# Patient Record
Sex: MALE | Race: WHITE | NOT HISPANIC OR LATINO
[De-identification: names, ages, dates, MRNs, and addresses within clinical notes are randomized per-mention and may not be internally consistent; named-entity substitution may affect disease eponyms.]

---
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
## 2019-01-04 ENCOUNTER — APPOINTMENT (OUTPATIENT)
Dept: PEDIATRIC ALLERGY IMMUNOLOGY | Facility: CLINIC | Age: 19
End: 2019-01-04
Payer: COMMERCIAL

## 2019-01-04 PROCEDURE — 90834 PSYTX W PT 45 MINUTES: CPT

## 2019-01-07 ENCOUNTER — APPOINTMENT (OUTPATIENT)
Dept: ENDOCRINOLOGY | Facility: CLINIC | Age: 19
End: 2019-01-07
Payer: COMMERCIAL

## 2019-01-07 VITALS
SYSTOLIC BLOOD PRESSURE: 122 MMHG | DIASTOLIC BLOOD PRESSURE: 70 MMHG | HEIGHT: 71 IN | HEART RATE: 79 BPM | WEIGHT: 262 LBS | BODY MASS INDEX: 36.68 KG/M2 | OXYGEN SATURATION: 97 %

## 2019-01-07 PROCEDURE — 99214 OFFICE O/P EST MOD 30 MIN: CPT

## 2019-01-07 NOTE — REVIEW OF SYSTEMS
[Tremors] : tremors [Heat Intolerance] : heat intolerant [Negative] : ENT [All other systems negative] : All other systems negative [Fatigue] : no fatigue [Decreased Appetite] : appetite not decreased [Neck Pain] : no neck pain [Nasal Congestion] : no nasal congestion [Chest Pain] : no chest pain [Palpitations] : no palpitations [Heart Rate Is Slow] : the heart rate was not slow [Heart Rate Is Fast] : the heart rate was not fast [Shortness Of Breath] : no shortness of breath [Wheezing] : no wheezing was heard [Nausea] : no nausea [Vomiting] : no vomiting was observed [Constipation] : no constipation [Diarrhea] : no diarrhea [Headache] : no headaches [Depression] : no depression [Anxiety] : no anxiety [Cold Intolerance] : cold tolerant [Easy Bleeding] : no ~M tendency for easy bleeding [Easy Bruising] : no tendency for easy bruising

## 2019-01-07 NOTE — ASSESSMENT
[FreeTextEntry1] : Patient is a 18 year old woman here for follow up hormone evaluation and continuance of gender affirming hormone therapy and abnormal thyroid function tests. \par \par 1. Male to Female: Discussed treatment with estradiol and spironolactone to attain testosterone level of < 50. Discussed the risks of DVT and thromboembolism, dyslipidemia, liver enzyme elevation with estradiol. Patient consents to treatment. Will monitor BP and potassium levels while on spironolactone. Will need to perform self-testicular exams and annual exam with risk screening for testicular cancer, prostate cancer.\par The patient provided limited information but did not go into much detail regarding gender story. States she had psychiatry care with Dr. Morris\par Increase estradiol to 2 mg BID and spironolactone 100 mg BID\par Repeat levels today\par Possible plans for bottom surgery at some point in the future\par Does not want to sperm bank at this time and has no plans for children in the future\par Not yet working on gender marker change, "not there yet"\par Psychiatry follow up\par \par \par 2. Abnormal thyroid labs\par -patient has elevated Free T4 and T3 along with high TSH receptor antibodies. Tremors on exam and symptoms of heat intolerance.  Repeat TFTs today
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
## 2019-01-07 NOTE — PHYSICAL EXAM
[Alert] : alert [No Acute Distress] : no acute distress [Well Nourished] : well nourished [Well Developed] : well developed [No Proptosis] : no proptosis [Normal Hearing] : hearing was normal [Thyroid Not Enlarged] : the thyroid was not enlarged [No Respiratory Distress] : no respiratory distress [Normal Rate and Effort] : normal respiratory rhythm and effort [No Accessory Muscle Use] : no accessory muscle use [Clear to Auscultation] : lungs were clear to auscultation bilaterally [Normal Rate] : heart rate was normal  [Normal S1, S2] : normal S1 and S2 [Regular Rhythm] : with a regular rhythm [Normal Bowel Sounds] : normal bowel sounds [Not Tender] : non-tender [Soft] : abdomen soft [Normal Gait] : normal gait [No Joint Swelling] : no joint swelling seen [Normal Strength/Tone] : muscle strength and tone were normal [Normal Insight/Judgement] : insight and judgment were intact [Normal Affect] : the affect was normal [Normal Mood] : the mood was normal [de-identified] : +tremors

## 2019-01-09 LAB
ALBUMIN SERPL ELPH-MCNC: 4.8 G/DL
ALP BLD-CCNC: 58 U/L
ALT SERPL-CCNC: 9 U/L
ANION GAP SERPL CALC-SCNC: 12 MMOL/L
AST SERPL-CCNC: 13 U/L
BASOPHILS # BLD AUTO: 0.03 K/UL
BASOPHILS NFR BLD AUTO: 0.4 %
BILIRUB SERPL-MCNC: 0.4 MG/DL
BUN SERPL-MCNC: 17 MG/DL
CALCIUM SERPL-MCNC: 9.9 MG/DL
CHLORIDE SERPL-SCNC: 103 MMOL/L
CHOLEST SERPL-MCNC: 209 MG/DL
CHOLEST/HDLC SERPL: 4.5 RATIO
CO2 SERPL-SCNC: 25 MMOL/L
CREAT SERPL-MCNC: 0.94 MG/DL
EOSINOPHIL # BLD AUTO: 0.16 K/UL
EOSINOPHIL NFR BLD AUTO: 2.1 %
ESTRADIOL SERPL-MCNC: 43 PG/ML
FSH SERPL-MCNC: 1.6 IU/L
GLUCOSE SERPL-MCNC: 90 MG/DL
HBA1C MFR BLD HPLC: 5.3 %
HCT VFR BLD CALC: 44.2 %
HDLC SERPL-MCNC: 46 MG/DL
HGB BLD-MCNC: 15 G/DL
IMM GRANULOCYTES NFR BLD AUTO: 0.5 %
LDLC SERPL CALC-MCNC: 134 MG/DL
LYMPHOCYTES # BLD AUTO: 1.96 K/UL
LYMPHOCYTES NFR BLD AUTO: 26.1 %
MAN DIFF?: NORMAL
MCHC RBC-ENTMCNC: 27.8 PG
MCHC RBC-ENTMCNC: 33.9 GM/DL
MCV RBC AUTO: 82 FL
MONOCYTES # BLD AUTO: 0.48 K/UL
MONOCYTES NFR BLD AUTO: 6.4 %
NEUTROPHILS # BLD AUTO: 4.85 K/UL
NEUTROPHILS NFR BLD AUTO: 64.5 %
PLATELET # BLD AUTO: 245 K/UL
POTASSIUM SERPL-SCNC: 4.3 MMOL/L
PROLACTIN SERPL-MCNC: 13.7 NG/ML
PROT SERPL-MCNC: 7.5 G/DL
RBC # BLD: 5.39 M/UL
RBC # FLD: 12.6 %
SODIUM SERPL-SCNC: 140 MMOL/L
T3 SERPL-MCNC: 139 NG/DL
T4 FREE SERPL-MCNC: 1.1 NG/DL
TESTOST SERPL-MCNC: 405 NG/DL
TRIGL SERPL-MCNC: 144 MG/DL
TSH SERPL-ACNC: 1.44 UIU/ML
WBC # FLD AUTO: 7.52 K/UL

## 2019-01-10 LAB — TSH RECEPTOR AB: <0.5 IU/L

## 2019-01-16 LAB — T4 FREE SERPL DIALY-MCNC: 0.94 NG/DL

## 2019-01-18 ENCOUNTER — APPOINTMENT (OUTPATIENT)
Dept: PEDIATRIC ALLERGY IMMUNOLOGY | Facility: CLINIC | Age: 19
End: 2019-01-18
Payer: COMMERCIAL

## 2019-01-18 PROCEDURE — 90834 PSYTX W PT 45 MINUTES: CPT

## 2019-01-31 ENCOUNTER — OTHER (OUTPATIENT)
Age: 19
End: 2019-01-31

## 2019-02-01 ENCOUNTER — APPOINTMENT (OUTPATIENT)
Dept: PEDIATRIC ALLERGY IMMUNOLOGY | Facility: CLINIC | Age: 19
End: 2019-02-01
Payer: COMMERCIAL

## 2019-02-01 PROCEDURE — 90834 PSYTX W PT 45 MINUTES: CPT

## 2019-02-13 ENCOUNTER — OTHER (OUTPATIENT)
Age: 19
End: 2019-02-13

## 2019-03-01 ENCOUNTER — APPOINTMENT (OUTPATIENT)
Dept: PEDIATRIC ALLERGY IMMUNOLOGY | Facility: CLINIC | Age: 19
End: 2019-03-01

## 2019-03-04 ENCOUNTER — OTHER (OUTPATIENT)
Age: 19
End: 2019-03-04

## 2019-03-06 ENCOUNTER — OTHER (OUTPATIENT)
Age: 19
End: 2019-03-06

## 2019-04-08 ENCOUNTER — TRANSCRIPTION ENCOUNTER (OUTPATIENT)
Age: 19
End: 2019-04-08

## 2019-04-09 ENCOUNTER — TRANSCRIPTION ENCOUNTER (OUTPATIENT)
Age: 19
End: 2019-04-09

## 2019-04-09 ENCOUNTER — RX RENEWAL (OUTPATIENT)
Age: 19
End: 2019-04-09

## 2019-04-09 RX ORDER — SPIRONOLACTONE 100 MG/1
100 TABLET ORAL DAILY
Qty: 30 | Refills: 0 | Status: DISCONTINUED | COMMUNITY
Start: 2018-08-31 | End: 2019-04-09

## 2019-04-09 RX ORDER — ESTRADIOL 2 MG/1
2 TABLET ORAL DAILY
Qty: 30 | Refills: 0 | Status: DISCONTINUED | COMMUNITY
Start: 2018-08-31 | End: 2019-04-09

## 2019-04-17 ENCOUNTER — APPOINTMENT (OUTPATIENT)
Dept: ENDOCRINOLOGY | Facility: CLINIC | Age: 19
End: 2019-04-17
Payer: COMMERCIAL

## 2019-04-17 VITALS
OXYGEN SATURATION: 98 % | HEIGHT: 71 IN | WEIGHT: 259 LBS | HEART RATE: 81 BPM | DIASTOLIC BLOOD PRESSURE: 70 MMHG | BODY MASS INDEX: 36.26 KG/M2 | SYSTOLIC BLOOD PRESSURE: 118 MMHG

## 2019-04-17 PROCEDURE — 99214 OFFICE O/P EST MOD 30 MIN: CPT

## 2019-04-17 NOTE — REVIEW OF SYSTEMS
[Negative] : Constitutional [All other systems negative] : All other systems negative [Fatigue] : no fatigue [Decreased Appetite] : appetite not decreased [Visual Field Defect] : no visual field defect [Blurry Vision] : no blurred vision [Dysphagia] : no dysphagia [Dysphonia] : no dysphonia [Chest Pain] : no chest pain [Palpitations] : no palpitations [Heart Rate Is Slow] : the heart rate was not slow [Heart Rate Is Fast] : the heart rate was not fast [Shortness Of Breath] : no shortness of breath [Wheezing] : no wheezing was heard [Cough] : no cough [SOB on Exertion] : no shortness of breath during exertion [Nausea] : no nausea [Vomiting] : no vomiting was observed [Constipation] : no constipation [Diarrhea] : no diarrhea [Headache] : no headaches [Tremors] : no tremors [Depression] : no depression [Anxiety] : no anxiety [Cold Intolerance] : cold tolerant [Heat Intolerance] : heat tolerant

## 2019-04-17 NOTE — PHYSICAL EXAM
[No Acute Distress] : no acute distress [Alert] : alert [Well Developed] : well developed [Well Nourished] : well nourished [No Proptosis] : no proptosis [Normal Hearing] : hearing was normal [No Respiratory Distress] : no respiratory distress [No Accessory Muscle Use] : no accessory muscle use [Normal Rate and Effort] : normal respiratory rhythm and effort [Normal S1, S2] : normal S1 and S2 [Normal Rate] : heart rate was normal  [Clear to Auscultation] : lungs were clear to auscultation bilaterally [Regular Rhythm] : with a regular rhythm [Not Tender] : non-tender [Soft] : abdomen soft [Normal Bowel Sounds] : normal bowel sounds [Normal Gait] : normal gait [No Joint Swelling] : no joint swelling seen [Normal Insight/Judgement] : insight and judgment were intact [Normal Strength/Tone] : muscle strength and tone were normal [Normal Affect] : the affect was normal [Normal Mood] : the mood was normal [de-identified] : +tremors

## 2019-04-17 NOTE — HISTORY OF PRESENT ILLNESS
[FreeTextEntry1] : Call PT: Arpan\par Sexual Orientation: Patient reports "none"\par \par Patient is a 20 yo transwoman here for follow up.  Was started on gender-affirming hormones on February 19, 2017. Patient's endocrinologist was Dr. Mary Ingram @ [place], [place]. \par Her regimen is Estradiol 2 mg BID and Spirolactone 100 mg BID. States adherence to medicines, might skip a dose of medicine once every three weeks.  Takes it along with vitamin D\par Patient has noticed zero changes, skin feels the same, has normal erections, no changes in body fat distribution\par Gender Affirming Surgeries: Patient is mostly interested in vaginoplasty down the line. \par Sexual Health: Patient has never been sexually active, no penetration or oral. Only kissing. Patient does not want to share who she is romantically and sexually interested in. \par General health: Reports that she has no general health concerns at this time. Feels well overall and has not been ill recently. Does feel tired lately. \par Reproductive Health: no plans for cryopreservation

## 2019-04-17 NOTE — ASSESSMENT
[FreeTextEntry1] : Patient is a 18 year old woman here for follow up hormone evaluation and continuance of gender affirming hormone therapy and abnormal thyroid function tests. \par \par 1. Male to Female: Discussed treatment with estradiol and spironolactone to attain testosterone level of < 50. Discussed the risks of DVT and thromboembolism, dyslipidemia, liver enzyme elevation with estradiol. Patient consents to treatment. Will monitor BP and potassium levels while on spironolactone. Will need to perform self-testicular exams and annual exam with risk screening for testicular cancer, prostate cancer.\par Has noticed no changes in clinical presentation dose of estradiol 4 mg daily and spironolactone 200 mg total daily\par Repeat levels today.\par Increase estradiol to 4 mg daily and spironolactone 300 mg daily. Other options include IM estradiol, estradiol patch and potentially additive therapy with finasteride\par Repeat levels today\par Possible plans for bottom surgery at some point in the future\par Does not want to sperm bank at this time and has no plans for children in the future\par Psychiatry follow up\par \par Follow up in 2 months stretcher

## 2019-04-18 LAB
25(OH)D3 SERPL-MCNC: 26.3 NG/ML
ALBUMIN SERPL ELPH-MCNC: 4.8 G/DL
ALP BLD-CCNC: 54 U/L
ALT SERPL-CCNC: 13 U/L
ANION GAP SERPL CALC-SCNC: 14 MMOL/L
AST SERPL-CCNC: 18 U/L
BASOPHILS # BLD AUTO: 0.04 K/UL
BASOPHILS NFR BLD AUTO: 0.7 %
BILIRUB SERPL-MCNC: 0.4 MG/DL
BUN SERPL-MCNC: 11 MG/DL
CALCIUM SERPL-MCNC: 9.8 MG/DL
CHLORIDE SERPL-SCNC: 106 MMOL/L
CHOLEST SERPL-MCNC: 176 MG/DL
CHOLEST/HDLC SERPL: 3.3 RATIO
CO2 SERPL-SCNC: 21 MMOL/L
CREAT SERPL-MCNC: 1.02 MG/DL
EOSINOPHIL # BLD AUTO: 0.12 K/UL
EOSINOPHIL NFR BLD AUTO: 2 %
ESTIMATED AVERAGE GLUCOSE: 97 MG/DL
ESTRADIOL SERPL-MCNC: 59 PG/ML
FSH SERPL-MCNC: 0.3 IU/L
GLUCOSE SERPL-MCNC: 102 MG/DL
HBA1C MFR BLD HPLC: 5 %
HCT VFR BLD CALC: 42.2 %
HDLC SERPL-MCNC: 53 MG/DL
HGB BLD-MCNC: 14.6 G/DL
IMM GRANULOCYTES NFR BLD AUTO: 0.2 %
LDLC SERPL CALC-MCNC: 102 MG/DL
LYMPHOCYTES # BLD AUTO: 1.72 K/UL
LYMPHOCYTES NFR BLD AUTO: 28.9 %
MAN DIFF?: NORMAL
MCHC RBC-ENTMCNC: 28.2 PG
MCHC RBC-ENTMCNC: 34.6 GM/DL
MCV RBC AUTO: 81.6 FL
MONOCYTES # BLD AUTO: 0.46 K/UL
MONOCYTES NFR BLD AUTO: 7.7 %
NEUTROPHILS # BLD AUTO: 3.6 K/UL
NEUTROPHILS NFR BLD AUTO: 60.5 %
PLATELET # BLD AUTO: 251 K/UL
POTASSIUM SERPL-SCNC: 4.6 MMOL/L
PROLACTIN SERPL-MCNC: 22.2 NG/ML
PROT SERPL-MCNC: 7.2 G/DL
RBC # BLD: 5.17 M/UL
RBC # FLD: 12.1 %
SODIUM SERPL-SCNC: 141 MMOL/L
TESTOST SERPL-MCNC: 62.8 NG/DL
TRIGL SERPL-MCNC: 107 MG/DL
TSH SERPL-ACNC: 1.52 UIU/ML
WBC # FLD AUTO: 5.95 K/UL

## 2019-06-03 ENCOUNTER — APPOINTMENT (OUTPATIENT)
Dept: ENDOCRINOLOGY | Facility: CLINIC | Age: 19
End: 2019-06-03
Payer: COMMERCIAL

## 2019-06-03 VITALS
OXYGEN SATURATION: 98 % | BODY MASS INDEX: 34.44 KG/M2 | HEIGHT: 71 IN | HEART RATE: 75 BPM | SYSTOLIC BLOOD PRESSURE: 110 MMHG | WEIGHT: 246 LBS | DIASTOLIC BLOOD PRESSURE: 70 MMHG

## 2019-06-03 PROCEDURE — 99214 OFFICE O/P EST MOD 30 MIN: CPT

## 2019-06-03 NOTE — REVIEW OF SYSTEMS
[Negative] : Constitutional [All other systems negative] : All other systems negative [Fatigue] : no fatigue [Decreased Appetite] : appetite not decreased [Visual Field Defect] : no visual field defect [Blurry Vision] : no blurred vision [Dry Eyes] : no dryness of the eyes [Dysphagia] : no dysphagia [Eyes Itch] : no itching of the eyes [Nasal Congestion] : no nasal congestion [Neck Pain] : no neck pain [Dysphonia] : no dysphonia [Heart Rate Is Slow] : the heart rate was not slow [Palpitations] : no palpitations [Chest Pain] : no chest pain [Heart Rate Is Fast] : the heart rate was not fast

## 2019-06-03 NOTE — HISTORY OF PRESENT ILLNESS
[FreeTextEntry1] : Call PT: Arpan\par Sexual Orientation: Patient reports "none"\par \par Patient is a 18 yo transwoman here for follow up. Was started on gender-affirming hormones on February 19, 2017. Patient's endocrinologist was Dr. Mary Ingram @ [place], [place]. \par Her regimen is Estradiol 4 mg BID and Spirolactone 300 mg daily. States adherence to medicines, might skip a dose of medicine once every three weeks. Takes it along with vitamin D\par Patient has noticed zero changes, skin feels the same, has normal erections, no changes in body fat distribution. Shaves about the same, really no difference other than some small breast growth\par Gender Affirming Surgeries: Patient is mostly interested in vaginoplasty down the line. \par Sexual Health: Patient has never been sexually active, no penetration or oral. Only kissing. Patient does not want to share who she is romantically and sexually interested in. \par General health: Reports that she has no general health concerns at this time. Feels well overall and has not been ill recently. Does feel tired lately. \par Reproductive Health: no plans for cryopreservation \par PRL level was mildly elevated to 22 at the last visit\par Testosterone 62.8\par Estradiol 59

## 2019-06-03 NOTE — ASSESSMENT
[FreeTextEntry1] : Patient is a 19 year old trans-woman here for follow up of gender affirming hormone therapy and abnormal thyroid function tests. \par \par 1. Male to Female: Discussed treatment with estradiol and spironolactone to attain testosterone level of < 50. Discussed the risks of DVT and thromboembolism, dyslipidemia, liver enzyme elevation with estradiol. Patient consents to treatment. Will monitor BP and potassium levels while on spironolactone. Will need to perform self-testicular exams and annual exam with risk screening for testicular cancer, prostate cancer.\par Patient is currently on estradiol 8 mg and spironolactone 300 mg daily.  These are high doses but no specific changes in clinical manifestations. Erections and hair growth is the same.  The only change is minimal breast growth.  This despite having testosterone levels close to goal. At this point, increase estradiol to 10 mg and if no improvement, change route to patches 0.1 mg twice a week. Continue spironolactone 300 mg.  We discussed that changes take time\par Repeat levels today\par Possible plans for bottom surgery at some point in the future\par Does not want to sperm bank at this time and has no plans for children in the future\par Psychiatry follow up\par Monitor PRL levels, check macroprolactin today\par \par Follow up in 2-3 months, call if there is improvement on estradiol patches

## 2019-06-06 LAB
ALBUMIN SERPL ELPH-MCNC: 4.7 G/DL
ALP BLD-CCNC: 48 U/L
ALT SERPL-CCNC: 11 U/L
ANION GAP SERPL CALC-SCNC: 15 MMOL/L
AST SERPL-CCNC: 16 U/L
BASOPHILS # BLD AUTO: 0.05 K/UL
BASOPHILS NFR BLD AUTO: 0.7 %
BILIRUB SERPL-MCNC: 0.4 MG/DL
BUN SERPL-MCNC: 16 MG/DL
CALCIUM SERPL-MCNC: 9.8 MG/DL
CHLORIDE SERPL-SCNC: 104 MMOL/L
CHOLEST SERPL-MCNC: 190 MG/DL
CHOLEST/HDLC SERPL: 3.5 RATIO
CO2 SERPL-SCNC: 20 MMOL/L
CREAT SERPL-MCNC: 0.8 MG/DL
EOSINOPHIL # BLD AUTO: 0.12 K/UL
EOSINOPHIL NFR BLD AUTO: 1.7 %
ESTIMATED AVERAGE GLUCOSE: 94 MG/DL
ESTRADIOL SERPL-MCNC: 104 PG/ML
FSH SERPL-MCNC: 0.3 IU/L
GLUCOSE SERPL-MCNC: 93 MG/DL
HBA1C MFR BLD HPLC: 4.9 %
HCT VFR BLD CALC: 41.8 %
HDLC SERPL-MCNC: 54 MG/DL
HGB BLD-MCNC: 14.1 G/DL
IMM GRANULOCYTES NFR BLD AUTO: 0.4 %
LDLC SERPL CALC-MCNC: 123 MG/DL
LYMPHOCYTES # BLD AUTO: 1.96 K/UL
LYMPHOCYTES NFR BLD AUTO: 28.5 %
MAN DIFF?: NORMAL
MCHC RBC-ENTMCNC: 28.5 PG
MCHC RBC-ENTMCNC: 33.7 GM/DL
MCV RBC AUTO: 84.6 FL
MONOCYTES # BLD AUTO: 0.46 K/UL
MONOCYTES NFR BLD AUTO: 6.7 %
NEUTROPHILS # BLD AUTO: 4.25 K/UL
NEUTROPHILS NFR BLD AUTO: 62 %
PLATELET # BLD AUTO: 263 K/UL
POTASSIUM SERPL-SCNC: 4.3 MMOL/L
PROLACTIN SERPL-MCNC: 11.9 NG/ML
PROT SERPL-MCNC: 7.2 G/DL
RBC # BLD: 4.94 M/UL
RBC # FLD: 12 %
SODIUM SERPL-SCNC: 139 MMOL/L
TESTOST SERPL-MCNC: 25.6 NG/DL
TRIGL SERPL-MCNC: 65 MG/DL
TSH SERPL-ACNC: 2.6 UIU/ML
WBC # FLD AUTO: 6.87 K/UL

## 2019-06-11 LAB
MONOMERIC PROLACTIN (ICMA)*: 8.2 NG/ML
PERCENT MACROPROLACTIN: 17 %
PROLACTIN, SERUM (ICMA)*: 9.9 NG/ML

## 2019-07-17 ENCOUNTER — OTHER (OUTPATIENT)
Age: 19
End: 2019-07-17

## 2019-08-21 ENCOUNTER — RX CHANGE (OUTPATIENT)
Age: 19
End: 2019-08-21

## 2019-08-21 ENCOUNTER — TRANSCRIPTION ENCOUNTER (OUTPATIENT)
Age: 19
End: 2019-08-21

## 2019-08-22 ENCOUNTER — RX CHANGE (OUTPATIENT)
Age: 19
End: 2019-08-22

## 2019-08-22 RX ORDER — ESTRADIOL 2 MG/1
2 TABLET ORAL
Qty: 450 | Refills: 3 | Status: COMPLETED | COMMUNITY
Start: 2018-07-17 | End: 2019-08-21

## 2019-11-05 ENCOUNTER — OTHER (OUTPATIENT)
Age: 19
End: 2019-11-05

## 2019-11-06 ENCOUNTER — RX RENEWAL (OUTPATIENT)
Age: 19
End: 2019-11-06

## 2019-11-11 ENCOUNTER — RX RENEWAL (OUTPATIENT)
Age: 19
End: 2019-11-11

## 2019-11-11 ENCOUNTER — TRANSCRIPTION ENCOUNTER (OUTPATIENT)
Age: 19
End: 2019-11-11

## 2019-12-03 ENCOUNTER — APPOINTMENT (OUTPATIENT)
Dept: ENDOCRINOLOGY | Facility: CLINIC | Age: 19
End: 2019-12-03
Payer: COMMERCIAL

## 2019-12-03 VITALS
WEIGHT: 252 LBS | DIASTOLIC BLOOD PRESSURE: 80 MMHG | OXYGEN SATURATION: 98 % | HEART RATE: 86 BPM | SYSTOLIC BLOOD PRESSURE: 123 MMHG | HEIGHT: 64 IN | BODY MASS INDEX: 43.02 KG/M2

## 2019-12-03 PROCEDURE — 99214 OFFICE O/P EST MOD 30 MIN: CPT

## 2019-12-03 NOTE — HISTORY OF PRESENT ILLNESS
[FreeTextEntry1] : Call PT: Arpan\par Sexual Orientation: Patient reports "none"\par \par Patient is a 20 yo transwoman here for follow up. Was started on gender-affirming hormones on February 19, 2017. Patient's endocrinologist was Dr. Mary Ingram @ [place], [place]. \par Her regimen is Estradiol 10 mg daily and Spirolactone 300 mg daily. Missed three doses the past week\par Patient has noticed some softening of skin, minimal breast growth but has normal erections, no changes in body fat distribution. Shaves about the same\par Gender Affirming Surgeries: Patient is mostly interested in vaginoplasty down the line. \par Sexual Health: Patient has never been sexually active, no penetration or oral. Only kissing. Patient does not want to share who she is romantically and sexually interested in. \par General health: Reports that she has no general health concerns at this time. Feels well overall and has not been ill recently. Does feel tired lately. \par Reproductive Health: no plans for cryopreservation \par

## 2019-12-03 NOTE — REVIEW OF SYSTEMS
[Negative] : Eyes [All other systems negative] : All other systems negative [Fatigue] : no fatigue [Decreased Appetite] : appetite not decreased [Visual Field Defect] : no visual field defect [Blurry Vision] : no blurred vision [Chest Pain] : no chest pain [Heart Rate Is Slow] : the heart rate was not slow [Palpitations] : no palpitations [Heart Rate Is Fast] : the heart rate was not fast [Shortness Of Breath] : no shortness of breath [Wheezing] : no wheezing was heard [Nausea] : no nausea [SOB on Exertion] : no shortness of breath during exertion [Cough] : no cough [Vomiting] : no vomiting was observed [Constipation] : no constipation [Diarrhea] : no diarrhea [Headache] : no headaches [Tremors] : no tremors [Anxiety] : no anxiety [Depression] : no depression [Cold Intolerance] : cold tolerant [Heat Intolerance] : heat tolerant [Easy Bleeding] : no ~M tendency for easy bleeding [Easy Bruising] : no tendency for easy bruising

## 2019-12-03 NOTE — ASSESSMENT
[FreeTextEntry1] : Arpan is a 20 yo transwoman here for follow up\par \par 1. Gender affirming therapy\par -despite being on hormones for the past two years, he has not noticed any significant differences in physical appearance\par -he is currently on estradiol 10 mg (high dose) and spironolactone 300 mg daily.  Levels are generally good with testo being < 50 and estradiol being in the low 100's\par -discussed that changes can take a long time but there should be some by the two year maria guadalupe\par -patient is amenable to changing to estradiol injections\par -check levels today and start estradiol cypionate 10 mg IM weekly. Continue spironolactone 300 mg daily\par -schedule nurse visit for injection training. Once training is done, I will send Rx for supplies and medicines

## 2019-12-03 NOTE — PHYSICAL EXAM
[Alert] : alert [Well Nourished] : well nourished [No Acute Distress] : no acute distress [EOMI] : extra ocular movement intact [Well Developed] : well developed [No Proptosis] : no proptosis [No Respiratory Distress] : no respiratory distress [Normal Rate and Effort] : normal respiratory rhythm and effort [No Accessory Muscle Use] : no accessory muscle use [Clear to Auscultation] : lungs were clear to auscultation bilaterally [Normal Rate] : heart rate was normal  [Normal S1, S2] : normal S1 and S2 [Regular Rhythm] : with a regular rhythm [Normal Bowel Sounds] : normal bowel sounds [Not Tender] : non-tender [Soft] : abdomen soft [Normal Gait] : normal gait [No Joint Swelling] : no joint swelling seen [No Clubbing, Cyanosis] : no clubbing  or cyanosis of the fingernails [Normal Strength/Tone] : muscle strength and tone were normal [No Motor Deficits] : the motor exam was normal [Normal Insight/Judgement] : insight and judgment were intact [No Tremors] : no tremors [Normal Affect] : the affect was normal [Normal Mood] : the mood was normal

## 2019-12-04 ENCOUNTER — RX RENEWAL (OUTPATIENT)
Age: 19
End: 2019-12-04

## 2019-12-04 ENCOUNTER — APPOINTMENT (OUTPATIENT)
Dept: ENDOCRINOLOGY | Facility: CLINIC | Age: 19
End: 2019-12-04
Payer: COMMERCIAL

## 2019-12-04 PROCEDURE — 99211 OFF/OP EST MAY X REQ PHY/QHP: CPT

## 2019-12-05 LAB
ALBUMIN SERPL ELPH-MCNC: 4.6 G/DL
ALP BLD-CCNC: 48 U/L
ALT SERPL-CCNC: 11 U/L
ANION GAP SERPL CALC-SCNC: 17 MMOL/L
AST SERPL-CCNC: 14 U/L
BASOPHILS # BLD AUTO: 0.08 K/UL
BASOPHILS NFR BLD AUTO: 0.8 %
BILIRUB SERPL-MCNC: 0.2 MG/DL
BUN SERPL-MCNC: 9 MG/DL
CALCIUM SERPL-MCNC: 9.5 MG/DL
CHLORIDE SERPL-SCNC: 103 MMOL/L
CHOLEST SERPL-MCNC: 208 MG/DL
CHOLEST/HDLC SERPL: 3.6 RATIO
CO2 SERPL-SCNC: 21 MMOL/L
CREAT SERPL-MCNC: 0.83 MG/DL
EOSINOPHIL # BLD AUTO: 0.16 K/UL
EOSINOPHIL NFR BLD AUTO: 1.6 %
ESTIMATED AVERAGE GLUCOSE: 94 MG/DL
ESTRADIOL SERPL-MCNC: 112 PG/ML
FSH SERPL-MCNC: 0.5 IU/L
GLUCOSE SERPL-MCNC: 89 MG/DL
HBA1C MFR BLD HPLC: 4.9 %
HCT VFR BLD CALC: 40.1 %
HDLC SERPL-MCNC: 58 MG/DL
HGB BLD-MCNC: 13.7 G/DL
IMM GRANULOCYTES NFR BLD AUTO: 0.4 %
LDLC SERPL CALC-MCNC: 108 MG/DL
LYMPHOCYTES # BLD AUTO: 1.97 K/UL
LYMPHOCYTES NFR BLD AUTO: 19.7 %
MAN DIFF?: NORMAL
MCHC RBC-ENTMCNC: 28.5 PG
MCHC RBC-ENTMCNC: 34.2 GM/DL
MCV RBC AUTO: 83.4 FL
MONOCYTES # BLD AUTO: 0.57 K/UL
MONOCYTES NFR BLD AUTO: 5.7 %
NEUTROPHILS # BLD AUTO: 7.16 K/UL
NEUTROPHILS NFR BLD AUTO: 71.8 %
PLATELET # BLD AUTO: 285 K/UL
POTASSIUM SERPL-SCNC: 4.5 MMOL/L
PROLACTIN SERPL-MCNC: 16.8 NG/ML
PROT SERPL-MCNC: 6.9 G/DL
RBC # BLD: 4.81 M/UL
RBC # FLD: 11.9 %
SODIUM SERPL-SCNC: 141 MMOL/L
TESTOST SERPL-MCNC: 18.1 NG/DL
TRIGL SERPL-MCNC: 212 MG/DL
TSH SERPL-ACNC: 2.97 UIU/ML
WBC # FLD AUTO: 9.98 K/UL

## 2019-12-30 ENCOUNTER — TRANSCRIPTION ENCOUNTER (OUTPATIENT)
Age: 19
End: 2019-12-30

## 2020-01-06 ENCOUNTER — TRANSCRIPTION ENCOUNTER (OUTPATIENT)
Age: 20
End: 2020-01-06

## 2020-01-07 ENCOUNTER — RX RENEWAL (OUTPATIENT)
Age: 20
End: 2020-01-07

## 2020-03-12 ENCOUNTER — APPOINTMENT (OUTPATIENT)
Dept: TRANSGENDER CARE | Facility: CLINIC | Age: 20
End: 2020-03-12
Payer: COMMERCIAL

## 2020-03-12 VITALS
BODY MASS INDEX: 34.3 KG/M2 | WEIGHT: 245 LBS | HEIGHT: 71 IN | DIASTOLIC BLOOD PRESSURE: 80 MMHG | SYSTOLIC BLOOD PRESSURE: 110 MMHG

## 2020-03-12 PROCEDURE — 99385 PREV VISIT NEW AGE 18-39: CPT

## 2020-03-12 PROCEDURE — 99395 PREV VISIT EST AGE 18-39: CPT

## 2020-03-12 PROCEDURE — 99213 OFFICE O/P EST LOW 20 MIN: CPT | Mod: 25

## 2020-03-12 PROCEDURE — 99205 OFFICE O/P NEW HI 60 MIN: CPT

## 2020-03-13 NOTE — PHYSICAL EXAM
[Well Nourished] : well nourished [Well Developed] : well developed [Well-Appearing] : well-appearing [Normal Sclera/Conjunctiva] : normal sclera/conjunctiva [EOMI] : extraocular movements intact [Normal Outer Ear/Nose] : the outer ears and nose were normal in appearance [Normal Oropharynx] : the oropharynx was normal [No Lymphadenopathy] : no lymphadenopathy [Supple] : supple [No Respiratory Distress] : no respiratory distress  [No Accessory Muscle Use] : no accessory muscle use [Clear to Auscultation] : lungs were clear to auscultation bilaterally [Normal Rate] : normal rate  [Regular Rhythm] : with a regular rhythm [Normal S1, S2] : normal S1 and S2 [No Murmur] : no murmur heard [No Edema] : there was no peripheral edema [No Extremity Clubbing/Cyanosis] : no extremity clubbing/cyanosis [Soft] : abdomen soft [Non Tender] : non-tender [Non-distended] : non-distended [Normal Bowel Sounds] : normal bowel sounds [Normal Posterior Cervical Nodes] : no posterior cervical lymphadenopathy [Normal Anterior Cervical Nodes] : no anterior cervical lymphadenopathy [No Joint Swelling] : no joint swelling [Grossly Normal Strength/Tone] : grossly normal strength/tone [No Rash] : no rash [Coordination Grossly Intact] : coordination grossly intact [No Focal Deficits] : no focal deficits [Normal Gait] : normal gait [de-identified] : +hypertonic trapezius, SCM, thoracic and lumbar paraspinals (left worse than right) [de-identified] : extremely depressed, tearful, anxious

## 2020-03-13 NOTE — REVIEW OF SYSTEMS
[Fever] : no fever [Chills] : no chills [Fatigue] : fatigue [Discharge] : no discharge [Redness] : no redness [Earache] : no earache [Nasal Discharge] : no nasal discharge [Chest Pain] : no chest pain [Palpitations] : no palpitations [Shortness Of Breath] : no shortness of breath [Wheezing] : no wheezing [Cough] : no cough [Abdominal Pain] : no abdominal pain [Nausea] : no nausea [Heartburn] : no heartburn [Dysuria] : no dysuria [Hematuria] : no hematuria [Frequency] : no frequency [Joint Pain] : no joint pain [Back Pain] : back pain [Itching] : no itching [Mole Changes] : no mole changes [Hair Changes] : no hair changes [Headache] : no headache [Dizziness] : no dizziness [Fainting] : no fainting [Suicidal] : not suicidal [Anxiety] : anxiety [Depression] : depression

## 2020-03-13 NOTE — HISTORY OF PRESENT ILLNESS
[FreeTextEntry1] : establishing primary care [de-identified] : Name: Michelle, but would like to use Karen going forward. \par AGAB: Male\par Gender ID: Female\par Pronouns: She/Her\par \par \par Reason For Engagement: PT is a 19 yr old assigned Male at birth, ID Female; he/him pronouns presenting for a primary care visit. PT denies any fever, cough, shortness of breath, contact with a person with COVID-19 or travelled to an affected area. \par \par General health: Feels well overall, but does feel tired very frequently. Has some back pain. No alleviating/aggravating factors. She reports pain is a "constant 4" on a scale of 0-10. She lifts very frequently at work. She reports feeling very sad most days. She reports she has suicidal ideation but has for years. She does not have any plan or intent.\par \par Health Maintenace: \par Has regular dental follow-up. \par Has not had an eye exam in many years.\par Wears seatbelt in the car.\par \par Endocrinology: In care with Dr. Echevarria. Despite hormone levels at goal. She reports  is unsatisfied with dosage, she states she feels the same. She has noticed some body fat redistribution and breast growth, but is still very dysphoric. \par \par Coping: PT states that she does not address her feelings when she is upset or frustrated. " I live through it". PT reported she didn't really receive much support from anyone, and that friends have ignored her coming out and continue to use male pronouns and refuse to acknowledge her trans identity. \par \par Gender Affirming Surgeries: PT is mostly interested in vaginoplasty. Not at this time. PT is also interested in FFS. \par \par Name Change/ Gender Marker: Eventually, not at this time. \par \par Nutrition: PT has no concerns right now\par \par Sexual Health: PT has never been sexually active, no penetration or oral. Only kissing. PT does not want to share who he is romantically and sexually interested in. \par

## 2020-06-04 ENCOUNTER — APPOINTMENT (OUTPATIENT)
Dept: TRANSGENDER CARE | Facility: CLINIC | Age: 20
End: 2020-06-04

## 2020-06-24 RX ORDER — SERTRALINE HYDROCHLORIDE 100 MG/1
100 TABLET, FILM COATED ORAL DAILY
Qty: 45 | Refills: 0 | Status: DISCONTINUED | COMMUNITY
Start: 2018-08-31 | End: 2020-06-24

## 2020-06-24 RX ORDER — IBUPROFEN 600 MG/1
600 TABLET, FILM COATED ORAL 3 TIMES DAILY
Qty: 9 | Refills: 0 | Status: DISCONTINUED | COMMUNITY
Start: 2020-03-13 | End: 2020-06-24

## 2020-06-24 RX ORDER — SERTRALINE HYDROCHLORIDE 100 MG/1
100 TABLET, FILM COATED ORAL
Refills: 0 | Status: DISCONTINUED | COMMUNITY
Start: 2018-08-20 | End: 2020-06-24

## 2020-06-25 ENCOUNTER — APPOINTMENT (OUTPATIENT)
Dept: ENDOCRINOLOGY | Facility: CLINIC | Age: 20
End: 2020-06-25

## 2020-07-08 ENCOUNTER — APPOINTMENT (OUTPATIENT)
Dept: TRANSGENDER CARE | Facility: CLINIC | Age: 20
End: 2020-07-08
Payer: COMMERCIAL

## 2020-07-08 ENCOUNTER — LABORATORY RESULT (OUTPATIENT)
Age: 20
End: 2020-07-08

## 2020-07-08 VITALS
HEART RATE: 83 BPM | BODY MASS INDEX: 34.16 KG/M2 | DIASTOLIC BLOOD PRESSURE: 74 MMHG | OXYGEN SATURATION: 98 % | TEMPERATURE: 96.1 F | SYSTOLIC BLOOD PRESSURE: 118 MMHG | WEIGHT: 244 LBS | HEIGHT: 71 IN

## 2020-07-08 PROCEDURE — 99213 OFFICE O/P EST LOW 20 MIN: CPT

## 2020-07-08 NOTE — PHYSICAL EXAM
[No Acute Distress] : no acute distress [Well Nourished] : well nourished [Well Developed] : well developed [Well-Appearing] : well-appearing [EOMI] : extraocular movements intact [Normal Outer Ear/Nose] : the outer ears and nose were normal in appearance [Normal Oropharynx] : the oropharynx was normal [Supple] : supple [No Respiratory Distress] : no respiratory distress  [No Accessory Muscle Use] : no accessory muscle use [Clear to Auscultation] : lungs were clear to auscultation bilaterally [Regular Rhythm] : with a regular rhythm [Normal S1, S2] : normal S1 and S2 [No Murmur] : no murmur heard [No Varicosities] : no varicosities [No Edema] : there was no peripheral edema [No Extremity Clubbing/Cyanosis] : no extremity clubbing/cyanosis [Non Tender] : non-tender [Soft] : abdomen soft [Non-distended] : non-distended [Normal Gait] : normal gait [No Focal Deficits] : no focal deficits [Normal Insight/Judgement] : insight and judgment were intact [de-identified] : +bilateral lumbar paraspinal hypertonicity, tender to palpation [de-identified] : warm, dry, intact [de-identified] : tearful

## 2020-07-08 NOTE — PLAN
[FreeTextEntry1] : #Back pain: Did not fill prescriptions given at last appointment. Says that pain is about the same, but does not feel it currently. Feels it more when she is working.  \par \par #Dysuria: Says this has been happening for years. Will check UA today to start workup. \par \par #Depression: Patient appears much less depressed this visit. Feels that she is doing about the same overall. Recently seen by Dr. Mackay and will continue on Sertraline at prior dose. Agrees to initiate therapy this time. \par \par #Gender affirming care: Patient is on hormone therapy an will continue to follow with Dr. Echevarria. Feels pretty good. No new changes. She wishes to eventually pursue name and gender marker change, but not at this time.  Encouraged re-engagement with mental health asap. Also encouraged patient to establish care with support groups to develop a support network of other transgender individuals, or to interact with trans individuals online as in person is not an option for her at this point. \par

## 2020-07-08 NOTE — HISTORY OF PRESENT ILLNESS
[FreeTextEntry1] : follow up back pain/depression [de-identified] : 20 year old trans female here today for follow-up on gender care, back pain, and depression. \par Patient feels ok overall physically, but is still struggling with depression. She saw Dr. Mackay recently and has been taking Sertraline. Concerta increased, but she hasn't been taking it. Plans to start soon. \par \kale Has not been using her name/pronouns with others, but would ideally like to eventually. She is struggling with mental health currently, and agrees that re-engagement might be a good idea. She has not seen a therapist since before our last visit.

## 2020-07-08 NOTE — REVIEW OF SYSTEMS
[Dysuria] : dysuria [Joint Pain] : joint pain [Back Pain] : back pain [Headache] : headache [Memory Loss] : memory loss [Insomnia] : insomnia [Anxiety] : anxiety [Depression] : depression [Fever] : no fever [Chills] : no chills [Pain] : no pain [Redness] : no redness [Earache] : no earache [Nasal Discharge] : no nasal discharge [Sore Throat] : no sore throat [Chest Pain] : no chest pain [Palpitations] : no palpitations [Diarrhea] : no diarrhea [Shortness Of Breath] : no shortness of breath [Cough] : no cough [Abdominal Pain] : no abdominal pain [Vomiting] : no vomiting [Hematuria] : no hematuria [Frequency] : no frequency [Itching] : no itching [Skin Rash] : no skin rash [Dizziness] : no dizziness [Suicidal] : not suicidal [de-identified] : difficulty falling asleep

## 2020-07-09 ENCOUNTER — APPOINTMENT (OUTPATIENT)
Dept: TRANSGENDER CARE | Facility: CLINIC | Age: 20
End: 2020-07-09
Payer: COMMERCIAL

## 2020-07-09 VITALS
WEIGHT: 244 LBS | HEIGHT: 71 IN | BODY MASS INDEX: 34.16 KG/M2 | DIASTOLIC BLOOD PRESSURE: 72 MMHG | OXYGEN SATURATION: 98 % | HEART RATE: 98 BPM | SYSTOLIC BLOOD PRESSURE: 124 MMHG | TEMPERATURE: 97.4 F

## 2020-07-09 PROCEDURE — 99214 OFFICE O/P EST MOD 30 MIN: CPT

## 2020-07-09 RX ORDER — ESTRADIOL 0.1 MG/D
0.1 PATCH, EXTENDED RELEASE TRANSDERMAL
Qty: 3 | Refills: 3 | Status: DISCONTINUED | COMMUNITY
Start: 2019-06-03 | End: 2020-07-09

## 2020-07-09 RX ORDER — ESTRADIOL 2 MG/1
2 TABLET ORAL
Qty: 450 | Refills: 1 | Status: DISCONTINUED | COMMUNITY
Start: 2019-08-22 | End: 2020-07-09

## 2020-07-09 NOTE — HISTORY OF PRESENT ILLNESS
[FreeTextEntry1] : Call PT: Karen\par \par Karen is a 20 yo transwoman here for follow up. Was started on gender-affirming hormones on February 19, 2017. Patient's endocrinologist was Dr. Mary Ingram @ [place], [place]. \par Karen was started on oral estradiol but had difficulty attaining therapeutic levels of estrogen. She was changed to IM estradiol December 2019\par Current medications are: estradiol 20 mg every two weeks 1 cc IM and Spirolactone 300 mg daily.  She is curious about progesterone and believes it could enhance breast growth \par Patient has noticed some softening of skin, minimal breast growth but has normal erections, no changes in body fat distribution. Denies changes in libido or erections\par Shaves about the same\par Gender Affirming Surgeries: Patient is mostly interested in vaginoplasty down the line. \par Sexual Health: Patient has never been sexually active, no penetration or oral. Only kissing. Patient does not want to share who she is romantically and sexually interested in. \par General health: Reports that she has no general health concerns at this time. Feels well overall and has not been ill recently. Does feel tired lately. \par States no new changes in mood, has baseline depression but believes its due to his living situation.  Will not provide details. Denies any abuse, lives with brother\par Reproductive Health: no plans for sperm banking

## 2020-07-09 NOTE — PHYSICAL EXAM
[Alert] : alert [Well Nourished] : well nourished [Healthy Appearance] : healthy appearance [EOMI] : extra ocular movement intact [No Respiratory Distress] : no respiratory distress [Normal Rate and Effort] : normal respiratory rate and effort [Normal Rate] : heart rate was normal [No Accessory Muscle Use] : no accessory muscle use [Normal Strength/Tone] : muscle strength and tone were normal [No Joint Swelling] : no joint swelling seen [Normal Gait] : normal gait [No Motor Deficits] : the motor exam was normal [Normal Insight/Judgement] : insight and judgment were intact [Normal Mood] : the mood was normal [de-identified] : flat affect

## 2020-07-09 NOTE — REVIEW OF SYSTEMS
[Depression] : depression [Dysphagia] : no dysphagia [Decreased Appetite] : appetite not decreased [Fatigue] : no fatigue [Chest Pain] : no chest pain [Dysphonia] : no dysphonia [Slow Heart Rate] : heart rate is not slow [Shortness Of Breath] : no shortness of breath [Fast Heart Rate] : heart rate is not fast [Palpitations] : no palpitations [Cough] : no cough [Nausea] : no nausea [Constipation] : no constipation [Vomiting] : no vomiting [Tremors] : no tremors [Headaches] : no headaches [Diarrhea] : no diarrhea [Anxiety] : no anxiety [de-identified] : States in a tough living situation-lives with brother, denies abuse [Suicidal Ideation] : no suicidal ideation

## 2020-07-09 NOTE — ASSESSMENT
[FreeTextEntry1] : Karen is a 19 yo transwoman here for follow up\par \par 1. Gender affirming therapy\par -despite being on hormones for the past two years, he has not noticed any significant differences in physical appearance.  Does not provide much details regarding changes.  States there is some breast growth but would like more\par -Karen is currently on estradiol 20 mg IM every TWO weeks, spironolactone 300 \par -will do trial of progesterone.  Breast cancer, mood swings discussed as possible side effects. If no significant improvement after three month trial can consider stopping\par -check levels today and adjust estradiol cypionate. Continue spironolactone 300 mg daily\par \par 2. Abnormal thyroid labs\par -repeat TFTs\par \par 3. Vitamin D deficiency\par -repeat levels\par \par Follow up with Dr. Bowser or Dr. Dior in 3 months

## 2020-07-15 ENCOUNTER — APPOINTMENT (OUTPATIENT)
Dept: TRANSGENDER CARE | Facility: CLINIC | Age: 20
End: 2020-07-15
Payer: COMMERCIAL

## 2020-07-15 PROCEDURE — 98968 PH1 ASSMT&MGMT NQHP 21-30: CPT

## 2020-07-16 LAB
APPEARANCE: CLEAR
BILIRUBIN URINE: NEGATIVE
BLOOD URINE: NEGATIVE
COLOR: YELLOW
GLUCOSE QUALITATIVE U: NEGATIVE
KETONES URINE: NEGATIVE
LEUKOCYTE ESTERASE URINE: ABNORMAL
NITRITE URINE: NEGATIVE
PH URINE: 7.5
PROTEIN URINE: ABNORMAL
SPECIFIC GRAVITY URINE: 1.03
UROBILINOGEN URINE: NORMAL

## 2020-07-27 LAB
ALBUMIN SERPL ELPH-MCNC: 5.1 G/DL
ALP BLD-CCNC: 50 U/L
ALT SERPL-CCNC: 10 U/L
ANION GAP SERPL CALC-SCNC: 14 MMOL/L
AST SERPL-CCNC: 19 U/L
BASOPHILS # BLD AUTO: 0.05 K/UL
BASOPHILS NFR BLD AUTO: 0.9 %
BILIRUB SERPL-MCNC: 0.4 MG/DL
BUN SERPL-MCNC: 14 MG/DL
CALCIUM SERPL-MCNC: 9.6 MG/DL
CHLORIDE SERPL-SCNC: 104 MMOL/L
CHOLEST SERPL-MCNC: 175 MG/DL
CHOLEST/HDLC SERPL: 3.1 RATIO
CO2 SERPL-SCNC: 22 MMOL/L
CREAT SERPL-MCNC: 0.97 MG/DL
EOSINOPHIL # BLD AUTO: 0.1 K/UL
EOSINOPHIL NFR BLD AUTO: 1.7 %
ESTRADIOL SERPL-MCNC: 35 PG/ML
FSH SERPL-MCNC: 1 IU/L
GLUCOSE SERPL-MCNC: 88 MG/DL
HCT VFR BLD CALC: 39.4 %
HDLC SERPL-MCNC: 57 MG/DL
HGB BLD-MCNC: 13.5 G/DL
IMM GRANULOCYTES NFR BLD AUTO: 0.3 %
LDLC SERPL CALC-MCNC: 110 MG/DL
LYMPHOCYTES # BLD AUTO: 1.95 K/UL
LYMPHOCYTES NFR BLD AUTO: 33.2 %
MAN DIFF?: NORMAL
MCHC RBC-ENTMCNC: 29.1 PG
MCHC RBC-ENTMCNC: 34.3 GM/DL
MCV RBC AUTO: 84.9 FL
MONOCYTES # BLD AUTO: 0.38 K/UL
MONOCYTES NFR BLD AUTO: 6.5 %
NEUTROPHILS # BLD AUTO: 3.38 K/UL
NEUTROPHILS NFR BLD AUTO: 57.4 %
PLATELET # BLD AUTO: 250 K/UL
POTASSIUM SERPL-SCNC: 4.6 MMOL/L
PROLACTIN SERPL-MCNC: 11.1 NG/ML
PROT SERPL-MCNC: 7.3 G/DL
RBC # BLD: 4.64 M/UL
RBC # FLD: 12.2 %
SODIUM SERPL-SCNC: 139 MMOL/L
T4 FREE SERPL-MCNC: 0.9 NG/DL
TESTOST SERPL-MCNC: 10.6 NG/DL
TRIGL SERPL-MCNC: 40 MG/DL
TSH SERPL-ACNC: 2.41 UIU/ML
WBC # FLD AUTO: 5.88 K/UL

## 2020-08-03 ENCOUNTER — APPOINTMENT (OUTPATIENT)
Dept: TRANSGENDER CARE | Facility: CLINIC | Age: 20
End: 2020-08-03
Payer: COMMERCIAL

## 2020-08-03 PROCEDURE — 98968 PH1 ASSMT&MGMT NQHP 21-30: CPT

## 2020-08-17 ENCOUNTER — APPOINTMENT (OUTPATIENT)
Dept: TRANSGENDER CARE | Facility: CLINIC | Age: 20
End: 2020-08-17
Payer: COMMERCIAL

## 2020-08-17 PROCEDURE — 98968 PH1 ASSMT&MGMT NQHP 21-30: CPT

## 2020-10-12 ENCOUNTER — APPOINTMENT (OUTPATIENT)
Dept: TRANSGENDER CARE | Facility: CLINIC | Age: 20
End: 2020-10-12
Payer: COMMERCIAL

## 2020-10-12 DIAGNOSIS — Z83.3 FAMILY HISTORY OF DIABETES MELLITUS: ICD-10-CM

## 2020-10-12 PROCEDURE — 99214 OFFICE O/P EST MOD 30 MIN: CPT | Mod: 25

## 2020-10-12 PROCEDURE — 36415 COLL VENOUS BLD VENIPUNCTURE: CPT

## 2020-10-12 NOTE — COUNSELING
[Inadequate social support] : Inadequate social support [Patient motivation] : Patient motivation [Engage in a relaxing activity] : Engage in a relaxing activity [Needs reinforcement, referred] : Patient needs reinforcement on understanding of lifestyle changes and steps needed to achieve self management goal; patient was referred

## 2020-10-12 NOTE — REVIEW OF SYSTEMS
Neuro Critical Care Transfer of Care note    Date of Admit: 9/11/2020  Date of Transfer / Stepdown: 9/18/2020    Brief History of Present Illness:      Donna Arias is a 59 y.o. female who  has a past medical history of Anxiety, Depression, and Hypertension.. The patient presented to [facility] on 9/11/2020 with a primary complaint of Seizures (Focal seizure this am per [facility]. Pt is currently PEC'D for hallucinations. No hx of seizures) and Transfer [facility] (need EEG monitor and it was not available at [facility] )    Has been followed by Epilepsy for recurrant seizures that possibly began following head injury 8 weeks ago. Head imaging on admit showed an area of restricted diffusion and edema throughout the right frontal lobe with sulcal effacement. Stroke team was consulted who felt that this was more likely related to seizure process vs infection. LP on 9/13 with 2500 RBC, 5 WBC, Glucose 60, Protein 167. Started empirically on acyclovir for 14 days (end 9/23). AEDs titrated and she is now on Keppra 2g BID, Onfi 20mg BID, Vimpat 200mg BID. Clinically improved and stable for step down to hospital medicine with general neurology consult.     Hospital Course By Problem with Pertinent Findings:     1. Seizure Disorder  - Epilepsy following, current AEDs Keppra 2g BID, Onfi 20mg BID, Vimpat 200mg BID  - Continue to de-escalate AEDs per Epilepsy recommendations  - Right frontal lesion improving on repeat imaging, continue empiric acyclovir x14 days   - Discontinued home Xanax due to risk of withdrawal SE  - cEEG  - General Neurology consult as needed  - Arrange for Epilepsy follow up       2. Hypokalemia  - Stable      Consultants and Procedures:     Consultants:  Epilepsy     Procedures:    LP 9/13/2020    Transfer Information:     Diet:  Regular    Physical Activity:  Following    To Do / Pending Studies / Follow ups:  - cEEG  - F/U Epilepsy recommendations for AEDs    Luigi Mclaughlin  Crtical Care     [Back Pain] : back pain [Depression] : depression [Negative] : Heme/Lymph [Suicidal] : not suicidal [FreeTextEntry9] : lower back pain - chronic

## 2020-10-12 NOTE — HEALTH RISK ASSESSMENT
[Fair] :  ~his/her~ mood as fair [With Family] : lives with family [Employed] : employed [Single] : single [Feels Safe at Home] : Feels safe at home [Fully functional (bathing, dressing, toileting, transferring, walking, feeding)] : Fully functional (bathing, dressing, toileting, transferring, walking, feeding) [Good] : ~his/her~ current health as good [No] : No [Never (0 pts)] : Never (0 points) [2] : 2) Feeling down, depressed, or hopeless for more than half of the days (2) [Change in mental status noted] : No change in mental status noted [Behavioral] : behavioral [Sexually Active] : not sexually active [High Risk Behavior] : no high risk behavior [de-identified] : lives with brother

## 2020-10-12 NOTE — PHYSICAL EXAM
[No Acute Distress] : no acute distress [Well Nourished] : well nourished [Well Developed] : well developed [Well-Appearing] : well-appearing [Normal Sclera/Conjunctiva] : normal sclera/conjunctiva [PERRL] : pupils equal round and reactive to light [EOMI] : extraocular movements intact [Normal Outer Ear/Nose] : the outer ears and nose were normal in appearance [Normal Oropharynx] : the oropharynx was normal [No JVD] : no jugular venous distention [No Lymphadenopathy] : no lymphadenopathy [Supple] : supple [Thyroid Normal, No Nodules] : the thyroid was normal and there were no nodules present [No Respiratory Distress] : no respiratory distress  [No Accessory Muscle Use] : no accessory muscle use [Clear to Auscultation] : lungs were clear to auscultation bilaterally [Normal Rate] : normal rate  [Regular Rhythm] : with a regular rhythm [Normal S1, S2] : normal S1 and S2 [No Murmur] : no murmur heard [No Carotid Bruits] : no carotid bruits [No Abdominal Bruit] : a ~M bruit was not heard ~T in the abdomen [No Varicosities] : no varicosities [Pedal Pulses Present] : the pedal pulses are present [No Edema] : there was no peripheral edema [No Palpable Aorta] : no palpable aorta [No Extremity Clubbing/Cyanosis] : no extremity clubbing/cyanosis [Normal Posterior Cervical Nodes] : no posterior cervical lymphadenopathy [Normal Anterior Cervical Nodes] : no anterior cervical lymphadenopathy [No CVA Tenderness] : no CVA  tenderness [No Spinal Tenderness] : no spinal tenderness [No Joint Swelling] : no joint swelling [Grossly Normal Strength/Tone] : grossly normal strength/tone [No Rash] : no rash [Coordination Grossly Intact] : coordination grossly intact [No Focal Deficits] : no focal deficits [Normal Gait] : normal gait [Alert and Oriented x3] : oriented to person, place, and time [Normal Insight/Judgement] : insight and judgment were intact [de-identified] : patient weepy and down in affect

## 2020-10-12 NOTE — HISTORY OF PRESENT ILLNESS
[FreeTextEntry1] : Patient reports she needs a refill on hormones and would like to formally establish Primary care with Dr. Steiner [de-identified] : LAUREEN HITCHCOCK (NICHOLAS) is a 20 year old, transfemale seen on 10/12/2020 for follow up on establishing primary care and management of gender affirming hormone therapy\par \par Preferred Pronouns:  she/her\par Sexual orientation: n/a\par Gender identity: female\par \par Transition history: Has not transitioned socially but identifies as female "professionally".  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 States that no one at work knows she is transgender but she was outed.  Also reports that "it didn't matter how the news was received" but was upset by the experience.  Reports no social outlet or nothing specific to relieve stress.\par \par Existing provider team:  PMD: Jatin  Endo: needs (Barby?)  \par \par Mental Health: Was on Sertraline and Concerta but has not taken any medications for 1 month.  States she would like to resume taking medications and would be interested in restarting.  As per PT, she may have discussed setting up appointments with Dr. Mackay and Dr. Alfaro but never followed up.\par Currently denies any thoughts of SI/HI\par History of mental health admission: Has been admitted 3 years ago but does not want to discuss.  States that she does not have a good support system currently.\par Reports chronic back pain, 2/10 in severity and described as dull, persistent.  States she does not have anything to relieve it.  Has some difficulties with ADLs, especially at work.\par \par Denies any known exposure to Covid-19 at this time.\par \par Goals of Trans care:\par 1)Reestablish mental health care with one of our providers.\par 2) Continue with Primary care with Dr. Steiner\par 3)  Eventual legal name change and gender marker\par \par \par \par

## 2020-10-12 NOTE — PLAN
[FreeTextEntry1] : Mental Health: Current mood/affect discussed with Dr. Steiner.   Discussed additional support systems ([facility] hotline, Support groups, social outlets) but is not interested at this time.  discussed importance of proper mental health care both for acute and long term management. Patient to go to walk-in clinic at [facility] for further evaluation and to obtain medication refiills as needed.  Patient to call following mental health evaluation for follow up appointment with Dr. Alfaro or Thompson.  Expressed understanding and adherence to current mental health plan. \par \par Transgender care: \par -Check all Endo labs today to establish current hormone levels and assess option for continuation of  hormone therapy.\par -Patient to schedule appointment with Dr. Dior for transfer of care and hormonal management.\par -No interest in surgical interventions at this time. \par

## 2020-10-12 NOTE — ASSESSMENT
[FreeTextEntry1] : Mental Health:\par Patient reports persistent feelings of depression and feeling down for some time.  Reports feelings worsened after she stopped taking her Sertraline and Concerta (did not have refills ordered).  States that she felt better when on her medication. Patient denies any thought of self harm or harming others.  Support group information has been offered but declined at this time. \par \par Gender: \par Patient reports satisfaction with current regimen (as prescribed by Dr. Echevarria) but would like to transfer endocrine care over to Dr. Dior for long term management.  Patient states that she still has some medication left but would need a refill in the near future.

## 2020-10-14 ENCOUNTER — NON-APPOINTMENT (OUTPATIENT)
Age: 20
End: 2020-10-14

## 2020-10-15 LAB
APPEARANCE: CLEAR
BASOPHILS # BLD AUTO: 0.07 K/UL
BASOPHILS NFR BLD AUTO: 1.3 %
BILIRUBIN URINE: NEGATIVE
BLOOD URINE: NEGATIVE
COLOR: YELLOW
EOSINOPHIL # BLD AUTO: 0.19 K/UL
EOSINOPHIL NFR BLD AUTO: 3.7 %
GLUCOSE QUALITATIVE U: NEGATIVE
HCT VFR BLD CALC: 41.3 %
HGB BLD-MCNC: 13.8 G/DL
IMM GRANULOCYTES NFR BLD AUTO: 0.2 %
KETONES URINE: NORMAL
LEUKOCYTE ESTERASE URINE: NEGATIVE
LYMPHOCYTES # BLD AUTO: 1.76 K/UL
LYMPHOCYTES NFR BLD AUTO: 33.9 %
MAN DIFF?: NORMAL
MCHC RBC-ENTMCNC: 28.6 PG
MCHC RBC-ENTMCNC: 33.4 GM/DL
MCV RBC AUTO: 85.5 FL
MONOCYTES # BLD AUTO: 0.38 K/UL
MONOCYTES NFR BLD AUTO: 7.3 %
NEUTROPHILS # BLD AUTO: 2.78 K/UL
NEUTROPHILS NFR BLD AUTO: 53.6 %
NITRITE URINE: NEGATIVE
PH URINE: 6.5
PLATELET # BLD AUTO: 282 K/UL
PROTEIN URINE: NEGATIVE
RBC # BLD: 4.83 M/UL
RBC # FLD: 12.1 %
SHBG SERPL-SCNC: 43 NMOL/L
SPECIFIC GRAVITY URINE: 1.03
UROBILINOGEN URINE: ABNORMAL
WBC # FLD AUTO: 5.19 K/UL

## 2020-10-19 ENCOUNTER — APPOINTMENT (OUTPATIENT)
Dept: TRANSGENDER CARE | Facility: CLINIC | Age: 20
End: 2020-10-19

## 2020-10-19 ENCOUNTER — NON-APPOINTMENT (OUTPATIENT)
Age: 20
End: 2020-10-19

## 2020-10-21 DIAGNOSIS — M54.9 DORSALGIA, UNSPECIFIED: ICD-10-CM

## 2020-10-21 DIAGNOSIS — F41.9 ANXIETY DISORDER, UNSPECIFIED: ICD-10-CM

## 2020-10-21 DIAGNOSIS — Z00.00 ENCOUNTER FOR GENERAL ADULT MEDICAL EXAMINATION W/OUT ABNORMAL FINDINGS: ICD-10-CM

## 2020-10-21 LAB
ALBUMIN SERPL ELPH-MCNC: 4.9 G/DL
ALP BLD-CCNC: 48 U/L
ALT SERPL-CCNC: 8 U/L
AST SERPL-CCNC: 17 U/L
BILIRUB SERPL-MCNC: 0.3 MG/DL
BUN SERPL-MCNC: 11 MG/DL
CALCIUM SERPL-MCNC: 9.5 MG/DL
CHLORIDE SERPL-SCNC: NORMAL
CO2 SERPL-SCNC: 16 MMOL/L
CREAT SERPL-MCNC: 0.81 MG/DL
GLUCOSE SERPL-MCNC: 72 MG/DL
POTASSIUM SERPL-SCNC: NORMAL
PROT SERPL-MCNC: 7.2 G/DL
SODIUM SERPL-SCNC: NORMAL

## 2020-10-26 LAB
MONOMERIC PROLACTIN (ICMA)*: 12 NG/ML
PERCENT MACROPROLACTIN: 14 %
PROLACTIN, SERUM (ICMA)*: 14 NG/ML

## 2020-11-13 ENCOUNTER — NON-APPOINTMENT (OUTPATIENT)
Age: 20
End: 2020-11-13

## 2020-11-13 LAB
ALBUMIN SERPL ELPH-MCNC: 4.9 G/DL
ALP BLD-CCNC: 41 U/L
ALT SERPL-CCNC: 11 U/L
ANION GAP SERPL CALC-SCNC: 12 MMOL/L
AST SERPL-CCNC: 16 U/L
BASOPHILS # BLD AUTO: 0.05 K/UL
BASOPHILS NFR BLD AUTO: 0.7 %
BILIRUB SERPL-MCNC: 0.6 MG/DL
BUN SERPL-MCNC: 14 MG/DL
CALCIUM SERPL-MCNC: 10 MG/DL
CHLORIDE SERPL-SCNC: 101 MMOL/L
CHOLEST SERPL-MCNC: 178 MG/DL
CO2 SERPL-SCNC: 23 MMOL/L
CREAT SERPL-MCNC: 0.85 MG/DL
EOSINOPHIL # BLD AUTO: 0.15 K/UL
EOSINOPHIL NFR BLD AUTO: 2 %
ESTRADIOL SERPL-MCNC: 113 PG/ML
FSH SERPL-MCNC: 2.2 IU/L
GLUCOSE SERPL-MCNC: 100 MG/DL
HCT VFR BLD CALC: 42.2 %
HDLC SERPL-MCNC: 52 MG/DL
HGB BLD-MCNC: 14.4 G/DL
IMM GRANULOCYTES NFR BLD AUTO: 0.5 %
LDLC SERPL CALC-MCNC: 109 MG/DL
LH SERPL-ACNC: 6.8 IU/L
LYMPHOCYTES # BLD AUTO: 2.35 K/UL
LYMPHOCYTES NFR BLD AUTO: 31.9 %
MAN DIFF?: NORMAL
MCHC RBC-ENTMCNC: 28.7 PG
MCHC RBC-ENTMCNC: 34.1 GM/DL
MCV RBC AUTO: 84.1 FL
MONOCYTES # BLD AUTO: 0.53 K/UL
MONOCYTES NFR BLD AUTO: 7.2 %
NEUTROPHILS # BLD AUTO: 4.24 K/UL
NEUTROPHILS NFR BLD AUTO: 57.7 %
NONHDLC SERPL-MCNC: 127 MG/DL
PLATELET # BLD AUTO: 237 K/UL
POTASSIUM SERPL-SCNC: 4.3 MMOL/L
PROLACTIN SERPL-MCNC: 22.1 NG/ML
PROT SERPL-MCNC: 7.3 G/DL
RBC # BLD: 5.02 M/UL
RBC # FLD: 11.8 %
SHBG SERPL-SCNC: 26 NMOL/L
SODIUM SERPL-SCNC: 137 MMOL/L
TESTOST SERPL-MCNC: 401 NG/DL
TRIGL SERPL-MCNC: 88 MG/DL
TSH SERPL-ACNC: 1.8 UIU/ML
WBC # FLD AUTO: 7.36 K/UL

## 2020-11-18 ENCOUNTER — NON-APPOINTMENT (OUTPATIENT)
Age: 20
End: 2020-11-18

## 2020-11-20 LAB
MONOMERIC PROLACTIN (ICMA)*: 19 NG/ML
PERCENT MACROPROLACTIN: 30 %
PROLACTIN, SERUM (ICMA)*: 27 NG/ML

## 2020-11-23 ENCOUNTER — NON-APPOINTMENT (OUTPATIENT)
Age: 20
End: 2020-11-23

## 2020-11-24 ENCOUNTER — NON-APPOINTMENT (OUTPATIENT)
Age: 20
End: 2020-11-24

## 2020-12-10 ENCOUNTER — APPOINTMENT (OUTPATIENT)
Dept: TRANSGENDER CARE | Facility: CLINIC | Age: 20
End: 2020-12-10
Payer: COMMERCIAL

## 2020-12-10 ENCOUNTER — NON-APPOINTMENT (OUTPATIENT)
Age: 20
End: 2020-12-10

## 2020-12-10 ENCOUNTER — APPOINTMENT (OUTPATIENT)
Dept: TRANSGENDER CARE | Facility: CLINIC | Age: 20
End: 2020-12-10

## 2020-12-10 VITALS
SYSTOLIC BLOOD PRESSURE: 118 MMHG | OXYGEN SATURATION: 96 % | WEIGHT: 258 LBS | DIASTOLIC BLOOD PRESSURE: 70 MMHG | HEART RATE: 88 BPM | BODY MASS INDEX: 36.12 KG/M2 | TEMPERATURE: 98.9 F | HEIGHT: 71 IN

## 2020-12-10 PROCEDURE — 99072 ADDL SUPL MATRL&STAF TM PHE: CPT

## 2020-12-10 PROCEDURE — 99215 OFFICE O/P EST HI 40 MIN: CPT

## 2020-12-10 NOTE — PHYSICAL EXAM
[Alert] : alert [Well Nourished] : well nourished [Healthy Appearance] : healthy appearance [EOMI] : extra ocular movement intact [No Respiratory Distress] : no respiratory distress [No Accessory Muscle Use] : no accessory muscle use [Normal Rate and Effort] : normal respiratory rate and effort [Normal Rate] : heart rate was normal [Normal Gait] : normal gait [No Joint Swelling] : no joint swelling seen [Normal Strength/Tone] : muscle strength and tone were normal [No Motor Deficits] : the motor exam was normal [Normal Insight/Judgement] : insight and judgment were intact [No Proptosis] : no proptosis [Normal Hearing] : hearing was normal [Normal S1, S2] : normal S1 and S2 [Regular Rhythm] : with a regular rhythm [No Edema] : no peripheral edema [Normal Bowel Sounds] : normal bowel sounds [Not Tender] : non-tender [Not Distended] : not distended [Soft] : abdomen soft [No Stigmata of Cushings Syndrome] : no stigmata of Cushings Syndrome [de-identified] : flat affect

## 2020-12-10 NOTE — REVIEW OF SYSTEMS
[Depression] : depression [Fatigue] : no fatigue [Decreased Appetite] : appetite not decreased [Dysphagia] : no dysphagia [Dysphonia] : no dysphonia [Chest Pain] : no chest pain [Slow Heart Rate] : heart rate is not slow [Palpitations] : no palpitations [Fast Heart Rate] : heart rate is not fast [Shortness Of Breath] : no shortness of breath [Cough] : no cough [Nausea] : no nausea [Constipation] : no constipation [Vomiting] : no vomiting [Diarrhea] : no diarrhea [Headaches] : no headaches [Tremors] : no tremors [Suicidal Ideation] : no suicidal ideation [Anxiety] : no anxiety [All other systems negative] : All other systems negative [de-identified] : States in a tough living situation-lives with brother, denies abuse

## 2020-12-10 NOTE — HISTORY OF PRESENT ILLNESS
[FreeTextEntry1] : Call PT: Karen\par Pronouns: She/Her\par \par Karen is a 19 yo transwoman here for follow up. Was started on gender-affirming hormones on February 19, 2017. Patient's endocrinologist was Dr. Mary Ingram @ [place], [place]. \par Karen was started on oral estradiol but had difficulty attaining therapeutic levels of estrogen. She was changed to IM estradiol December 2019\par Current medications are: estradiol 20 mg every two weeks 1 cc IM and Spirolactone 300 mg daily. Started on progesterone 100mg daily. \par Patient has noticed some softening of skin, minimal breast growth but has normal erections, no changes in body fat distribution. Denies changes in libido or erections\par Shaves about the same\par Gender Affirming Surgeries: Patient is mostly interested in vaginoplasty down the line. \par Sexual Health: Patient has never been sexually active, no penetration or oral. Only kissing. Patient does not want to share who she is romantically and sexually interested in. \par General health: Reports that she has no general health concerns at this time. Feels well overall and has not been ill recently. Does feel tired lately. \par States no new changes in mood, has baseline depression but believes its due to his living situation.  Will not provide details. Denies any abuse, lives with brother\par Reproductive Health: no plans for sperm banking

## 2020-12-10 NOTE — ASSESSMENT
[FreeTextEntry1] : Karen is a 21 yo transwoman here for follow up\par \par 1. Gender affirming therapy\par -despite being on hormones for the past two years, he has not noticed any significant differences in physical appearance.  Does not provide much details regarding changes.  States there is some breast growth but would like more\par -Karen is currently on estradiol 20 mg IM every TWO weeks, recommend split the dose into a weekly injection dose, which may help, continue with spironolactone 300 \par -continue with trial of progesterone.  Breast cancer, mood swings discussed as possible side effects. If no significant improvement after three month trial can consider stopping

## 2021-01-25 ENCOUNTER — NON-APPOINTMENT (OUTPATIENT)
Age: 21
End: 2021-01-25

## 2021-02-11 ENCOUNTER — NON-APPOINTMENT (OUTPATIENT)
Age: 21
End: 2021-02-11

## 2021-02-11 ENCOUNTER — APPOINTMENT (OUTPATIENT)
Dept: TRANSGENDER CARE | Facility: CLINIC | Age: 21
End: 2021-02-11
Payer: COMMERCIAL

## 2021-02-25 ENCOUNTER — APPOINTMENT (OUTPATIENT)
Dept: TRANSGENDER CARE | Facility: CLINIC | Age: 21
End: 2021-02-25
Payer: COMMERCIAL

## 2021-02-25 VITALS
TEMPERATURE: 98 F | BODY MASS INDEX: 37.52 KG/M2 | HEIGHT: 71 IN | DIASTOLIC BLOOD PRESSURE: 70 MMHG | SYSTOLIC BLOOD PRESSURE: 112 MMHG | WEIGHT: 268 LBS | OXYGEN SATURATION: 98 % | HEART RATE: 86 BPM

## 2021-02-25 PROCEDURE — 99072 ADDL SUPL MATRL&STAF TM PHE: CPT

## 2021-02-25 PROCEDURE — 99213 OFFICE O/P EST LOW 20 MIN: CPT

## 2021-02-25 NOTE — REVIEW OF SYSTEMS
[Depression] : depression [All other systems negative] : All other systems negative [Fatigue] : no fatigue [Decreased Appetite] : appetite not decreased [Dysphagia] : no dysphagia [Dysphonia] : no dysphonia [Chest Pain] : no chest pain [Slow Heart Rate] : heart rate is not slow [Palpitations] : no palpitations [Fast Heart Rate] : heart rate is not fast [Shortness Of Breath] : no shortness of breath [Cough] : no cough [Nausea] : no nausea [Constipation] : no constipation [Vomiting] : no vomiting [Diarrhea] : no diarrhea [Headaches] : no headaches [Tremors] : no tremors [Suicidal Ideation] : no suicidal ideation [Anxiety] : no anxiety [de-identified] : States in a tough living situation-lives with brother, denies abuse

## 2021-02-25 NOTE — PHYSICAL EXAM
[Alert] : alert [Well Nourished] : well nourished [Healthy Appearance] : healthy appearance [EOMI] : extra ocular movement intact [No Proptosis] : no proptosis [Normal Hearing] : hearing was normal [No Respiratory Distress] : no respiratory distress [No Accessory Muscle Use] : no accessory muscle use [Normal Rate and Effort] : normal respiratory rate and effort [Normal S1, S2] : normal S1 and S2 [Normal Rate] : heart rate was normal [Regular Rhythm] : with a regular rhythm [No Edema] : no peripheral edema [Normal Bowel Sounds] : normal bowel sounds [Not Tender] : non-tender [Not Distended] : not distended [Soft] : abdomen soft [No Stigmata of Cushings Syndrome] : no stigmata of Cushings Syndrome [Normal Gait] : normal gait [No Joint Swelling] : no joint swelling seen [Normal Strength/Tone] : muscle strength and tone were normal [No Motor Deficits] : the motor exam was normal [Normal Insight/Judgement] : insight and judgment were intact [Gynecomastia] : gynecomastia present [de-identified] : flat affect

## 2021-02-25 NOTE — DATA REVIEWED
[FreeTextEntry1] : Labs 11/2020:\par CBC normal\par CMP normal except glucose of 100\par Testosterone 401\par Estradiol 113\par Prolactin 22.1\par TSH 1.80

## 2021-02-25 NOTE — ASSESSMENT
[FreeTextEntry1] : Karen is a 19 yo transwoman here for follow up\par \par 1. Gender affirming therapy\par -despite being on hormones for the past two years, he has not noticed any significant differences in physical appearance.  Does not provide much details regarding changes.  States there is some breast growth but would like more although has not been adherent. \par -Karen is currently on estradiol 10 mg IM every week (was on 20mg every 2 weeks previously), recommended split the dose into a weekly injection dose, which may help, continue with spironolactone 300 \par -continue with trial of progesterone.  Breast cancer, mood swings discussed as possible side effects. If no significant improvement after three month trial can consider stopping\par -Labs to be done once more adherent to hormone regimen.\par -advised mental health follow-up for depression.\par \par Prep time with review of labs and interval progress notes and consultations 5 min\par Discussion with patient regarding hormone regimen, adherence, management plan, risks and benefits, treatment options and goals of care answering all patients questions and addressing all concerns 15 min\par Post-visit completion charting and review 5 min\par Total Time 20 min

## 2021-02-25 NOTE — HISTORY OF PRESENT ILLNESS
[FreeTextEntry1] : Call PT: Karen\par Pronouns: She/Her\par \par Karen is a 19 yo transwoman here for follow up. Was started on gender-affirming hormones on February 19, 2017. Patient's endocrinologist was Dr. Mary Ingram @ [place], [place]. \par Karen was started on oral estradiol but had difficulty attaining therapeutic levels of estrogen. She was changed to IM estradiol December 2019\par Seen initially by me 12/2020 after following with Dr. Echevarria. Recommended change bimonthly dosing of estrogen to weekly at 0.5 cc and continue with Spirolactone 300 mg daily. Started on progesterone 100mg daily. Since starting the hormones she has noticed some softening of skin, minimal breast growth but has normal erections, no changes in body fat distribution. Denies changes in libido or erections\par Shaves about the same\par Recently has not been adherent to hormones due to depression. She cant pinpoint what's causing the depression but that "its always there." Has not been following with a mental health professional. Denies any suicidal ideations at this time. \par Gender Affirming Surgeries: Patient is mostly interested in vaginoplasty down the line. \par Sexual Health: Patient has never been sexually active, no penetration or oral. Only kissing. Patient does not want to share who she is romantically and sexually interested in. \par General health: Reports that she has no general health concerns at this time. Feels well overall and has not been ill recently. Does feel tired lately. \par States no new changes in mood, has baseline depression but believes its due to his living situation.  Will not provide details. Denies any abuse, lives with brother\par Reproductive Health: no plans for sperm banking

## 2021-04-15 ENCOUNTER — NON-APPOINTMENT (OUTPATIENT)
Age: 21
End: 2021-04-15

## 2021-04-22 ENCOUNTER — APPOINTMENT (OUTPATIENT)
Dept: TRANSGENDER CARE | Facility: CLINIC | Age: 21
End: 2021-04-22

## 2021-10-11 ENCOUNTER — NON-APPOINTMENT (OUTPATIENT)
Age: 21
End: 2021-10-11

## 2021-10-13 ENCOUNTER — TRANSCRIPTION ENCOUNTER (OUTPATIENT)
Age: 21
End: 2021-10-13

## 2023-01-09 ENCOUNTER — LABORATORY RESULT (OUTPATIENT)
Age: 23
End: 2023-01-09

## 2023-01-09 ENCOUNTER — APPOINTMENT (OUTPATIENT)
Dept: TRANSGENDER CARE | Facility: CLINIC | Age: 23
End: 2023-01-09

## 2023-01-09 ENCOUNTER — APPOINTMENT (OUTPATIENT)
Dept: TRANSGENDER CARE | Facility: CLINIC | Age: 23
End: 2023-01-09
Payer: COMMERCIAL

## 2023-01-09 VITALS
HEART RATE: 74 BPM | HEIGHT: 71 IN | DIASTOLIC BLOOD PRESSURE: 74 MMHG | OXYGEN SATURATION: 97 % | TEMPERATURE: 98.3 F | SYSTOLIC BLOOD PRESSURE: 126 MMHG

## 2023-01-09 DIAGNOSIS — E55.9 VITAMIN D DEFICIENCY, UNSPECIFIED: ICD-10-CM

## 2023-01-09 DIAGNOSIS — R79.89 OTHER SPECIFIED ABNORMAL FINDINGS OF BLOOD CHEMISTRY: ICD-10-CM

## 2023-01-09 PROCEDURE — 36415 COLL VENOUS BLD VENIPUNCTURE: CPT

## 2023-01-09 PROCEDURE — 99205 OFFICE O/P NEW HI 60 MIN: CPT | Mod: 25,GC

## 2023-01-09 RX ORDER — METHOCARBAMOL 750 MG/1
750 TABLET, FILM COATED ORAL 4 TIMES DAILY
Qty: 30 | Refills: 0 | Status: DISCONTINUED | COMMUNITY
Start: 2020-03-13 | End: 2023-01-09

## 2023-01-09 RX ORDER — NEEDLES, SAFETY 22GX1 1/2"
23G X 1" NEEDLE, DISPOSABLE MISCELLANEOUS
Qty: 1 | Refills: 5 | Status: DISCONTINUED | COMMUNITY
Start: 2021-02-25 | End: 2023-01-09

## 2023-01-09 RX ORDER — CHOLECALCIFEROL (VITAMIN D3) 50 MCG
50 MCG TABLET ORAL
Refills: 0 | Status: DISCONTINUED | COMMUNITY
End: 2023-01-09

## 2023-01-09 RX ORDER — SERTRALINE HYDROCHLORIDE 100 MG/1
100 TABLET, FILM COATED ORAL DAILY
Qty: 60 | Refills: 0 | Status: DISCONTINUED | COMMUNITY
Start: 2020-06-24 | End: 2023-01-09

## 2023-01-09 RX ORDER — PROGESTERONE 100 MG/1
100 CAPSULE ORAL
Qty: 90 | Refills: 3 | Status: DISCONTINUED | COMMUNITY
Start: 2020-07-09 | End: 2023-01-09

## 2023-01-09 NOTE — END OF VISIT
[Time Spent: ___ minutes] : I have spent [unfilled] minutes of time on the encounter. [FreeTextEntry3] : Pt seen with Bro ([employer] Fellow)

## 2023-01-09 NOTE — SOCIAL HISTORY
[Oral-Receptive (pt. mouth)] : oral-receptive (pt. mouth) [Both ___] : [unfilled] male and female [Never] : never [Vaginal] : vaginal [Female ___] : [unfilled] female [Date of most recent sexual encounter ___] : ~His/Her~ most recent sexual encounter was [unfilled] [de-identified] : Never tested to his knowledge

## 2023-01-09 NOTE — HISTORY OF PRESENT ILLNESS
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 States that no one at work knows she is transgender but she was outed. People there make fun of trans people so she does not want to come out. \par \par Existing provider team: PMD: none  Endo: Last saw Dr. Dior (last saw Feb 2021), no endocrinologist in between. \par \par Home: Lives with 25 year old brother. \par Employment: Works at [employer]. \par Activities: Plays video games (plays "whatever"). \par Substances: Takes marijuana edibles once roughly every few months ("twice a year if that"). \par \par Mental Health: Previously in care with therapy (never regular), and psychiatry (more than 1 year ago). Stopped 1-2 years ago because he became depressed and didn't want ot take medidcation anymore or see doctors anymore so when refills ran out she stopped. Mood is "neutral," and "all over the place" but feels it is curerntly better than 1-2 years ago. Was previously on sertraline and concerta but stopped those when refills ran out. States she would like to resume taking medications. Has had thoughts about SIB and SI without intent or plan. \par \par History of mental health admission: Has been admitted 3 years ago for worsening SI at [facility]. \par \par Reports chronic back pain, says the pain varies between 2-5/10; not lifting as much at work and knows how ot lift things when he has to. \par \par Had COVID-19 in December 2022 (2nd time), feels body weakness since then. No breathing difficulties this time, when he had COVID the first time he had SOB.   received 2 dsoes of COVID19 vaccine. \par \par Not intested in influenza vaccine today. \par \par Looking to have hormons make her feel better in her body by helping skin feel better, develop chest.   Young comes and goes; it grows slower on estrogen. \par \par Since off hormones not sure how they feels different. \par \par Does not care if they have errection or not. \par \par Goals of Trans care:\par 1) Restart GHT\par 2) Reestablish mental health care with one of our providers.\par 3) Eventual legal name change and gender marker\par 4) Eventual bottom surgery, not now\par \par

## 2023-01-17 DIAGNOSIS — F64.0 TRANSSEXUALISM: ICD-10-CM

## 2023-01-17 DIAGNOSIS — Z23 ENCOUNTER FOR IMMUNIZATION: ICD-10-CM

## 2023-01-17 RX ORDER — ADHESIVE TAPE 3"X 2.3 YD
50 MCG TAPE, NON-MEDICATED TOPICAL
Qty: 30 | Refills: 3 | Status: ACTIVE | COMMUNITY
Start: 2023-01-17 | End: 1900-01-01

## 2023-01-25 LAB
25(OH)D3 SERPL-MCNC: 11.5 NG/ML
ALBUMIN SERPL ELPH-MCNC: 4.6 G/DL
ALP BLD-CCNC: 54 U/L
ALT SERPL-CCNC: 42 U/L
ANION GAP SERPL CALC-SCNC: 10 MMOL/L
APPEARANCE: CLEAR
AST SERPL-CCNC: 74 U/L
BASOPHILS # BLD AUTO: 0.04 K/UL
BASOPHILS NFR BLD AUTO: 0.6 %
BILIRUB SERPL-MCNC: 0.5 MG/DL
BILIRUBIN URINE: NEGATIVE
BLOOD URINE: ABNORMAL
BUN SERPL-MCNC: 16 MG/DL
C TRACH RRNA SPEC QL NAA+PROBE: NOT DETECTED
CALCIUM SERPL-MCNC: 9.5 MG/DL
CHLORIDE SERPL-SCNC: 105 MMOL/L
CHOLEST SERPL-MCNC: 181 MG/DL
CO2 SERPL-SCNC: 25 MMOL/L
COLOR: NORMAL
CREAT SERPL-MCNC: 1.01 MG/DL
EGFR: 107 ML/MIN/1.73M2
EOSINOPHIL # BLD AUTO: 0.16 K/UL
EOSINOPHIL NFR BLD AUTO: 2.4 %
ESTIMATED AVERAGE GLUCOSE: 97 MG/DL
ESTRADIOL SERPL-MCNC: 17 PG/ML
FSH SERPL-MCNC: 4.8 IU/L
GLUCOSE QUALITATIVE U: NEGATIVE
GLUCOSE SERPL-MCNC: 85 MG/DL
HBA1C MFR BLD HPLC: 5 %
HBV CORE IGG+IGM SER QL: NONREACTIVE
HBV SURFACE AB SERPL IA-ACNC: <3 MIU/ML
HBV SURFACE AG SER QL: NONREACTIVE
HCT VFR BLD CALC: 46.4 %
HCV AB SER QL: NONREACTIVE
HCV S/CO RATIO: 0.15 S/CO
HDLC SERPL-MCNC: 41 MG/DL
HEPATITIS A IGG ANTIBODY: REACTIVE
HGB BLD-MCNC: 15.1 G/DL
HIV1+2 AB SPEC QL IA.RAPID: NONREACTIVE
IMM GRANULOCYTES NFR BLD AUTO: 0.5 %
KETONES URINE: NEGATIVE
LDLC SERPL CALC-MCNC: 127 MG/DL
LEUKOCYTE ESTERASE URINE: NEGATIVE
LH SERPL-ACNC: 9.8 IU/L
LYMPHOCYTES # BLD AUTO: 1.64 K/UL
LYMPHOCYTES NFR BLD AUTO: 25 %
MAN DIFF?: NORMAL
MCHC RBC-ENTMCNC: 26.4 PG
MCHC RBC-ENTMCNC: 32.5 GM/DL
MCV RBC AUTO: 81.1 FL
MONOCYTES # BLD AUTO: 0.4 K/UL
MONOCYTES NFR BLD AUTO: 6.1 %
MONOMERIC PROLACTIN (ICMA)*: 6.4 NG/ML
N GONORRHOEA RRNA SPEC QL NAA+PROBE: NOT DETECTED
NEUTROPHILS # BLD AUTO: 4.28 K/UL
NEUTROPHILS NFR BLD AUTO: 65.4 %
NITRITE URINE: NEGATIVE
NONHDLC SERPL-MCNC: 140 MG/DL
PERCENT MACROPROLACTIN: 20 %
PH URINE: 6.5
PLATELET # BLD AUTO: 240 K/UL
POTASSIUM SERPL-SCNC: 4.2 MMOL/L
PROLACTIN SERPL-MCNC: 7.9 NG/ML
PROLACTIN, SERUM (ICMA)*: 7.99 NG/ML
PROT SERPL-MCNC: 6.9 G/DL
PROTEIN URINE: NORMAL
RBC # BLD: 5.72 M/UL
RBC # FLD: 13.2 %
SHBG SERPL-SCNC: 19.5 NMOL/L
SODIUM SERPL-SCNC: 141 MMOL/L
SOURCE AMPLIFICATION: NORMAL
SOURCE ANAL: NORMAL
SOURCE ORAL: NORMAL
SPECIFIC GRAVITY URINE: 1.03
T PALLIDUM AB SER QL IA: NEGATIVE
TESTOST SERPL-MCNC: 502 NG/DL
TRIGL SERPL-MCNC: 62 MG/DL
TSH SERPL-ACNC: 1.77 UIU/ML
UROBILINOGEN URINE: NORMAL
WBC # FLD AUTO: 6.55 K/UL

## 2023-01-30 ENCOUNTER — APPOINTMENT (OUTPATIENT)
Dept: TRANSGENDER CARE | Facility: CLINIC | Age: 23
End: 2023-01-30
Payer: COMMERCIAL

## 2023-01-30 VITALS
TEMPERATURE: 98.3 F | WEIGHT: 247 LBS | BODY MASS INDEX: 34.58 KG/M2 | OXYGEN SATURATION: 97 % | HEART RATE: 77 BPM | DIASTOLIC BLOOD PRESSURE: 78 MMHG | SYSTOLIC BLOOD PRESSURE: 116 MMHG | HEIGHT: 71 IN

## 2023-01-30 PROCEDURE — 99213 OFFICE O/P EST LOW 20 MIN: CPT

## 2023-01-30 RX ORDER — BLOOD-GLUCOSE METER
70 EACH MISCELLANEOUS
Qty: 1 | Refills: 3 | Status: ACTIVE | COMMUNITY
Start: 2019-12-04 | End: 1900-01-01

## 2023-01-30 NOTE — HISTORY OF PRESENT ILLNESS
[FreeTextEntry1] : f/u [de-identified] : Patient here today to restart gaht. They report that they used to follow dr. asif, ran out of hormones and just never restarted/went back for renewals. They would like to restart their hormone regimen that they were last on, cathy, progesterone, and estradiol injections. They report they did well on their hormones, no issues. They are not interested in primary care at this time, just gaht. They decline hepatitis B vaccine. Pt denies any SOB, cough, chest pain, palpitations, N/V/D/C, fever, or chills. No other health concerns today.

## 2023-01-30 NOTE — PLAN
[FreeTextEntry1] : \par \par Patient here today to restart gender affirming hormone care started by previous provider. Patient counseled on benefits/risks in continuing GAHT and would like to continue at same dose as previously on, counseled. Will order routine blood work and f/u results to patient once made available. As patient would not like to see PCP currently, we will refer back to Dr. Dior for management of gaht. Patient to see endo within 3 months of restarting gaht. Advised patient to call with any questions or concerns. Patient verbalized understanding.

## 2023-04-21 ENCOUNTER — RX RENEWAL (OUTPATIENT)
Age: 23
End: 2023-04-21

## 2023-04-25 ENCOUNTER — APPOINTMENT (OUTPATIENT)
Dept: TRANSGENDER CARE | Facility: CLINIC | Age: 23
End: 2023-04-25
Payer: COMMERCIAL

## 2023-04-25 VITALS
WEIGHT: 221 LBS | HEIGHT: 71 IN | BODY MASS INDEX: 30.94 KG/M2 | OXYGEN SATURATION: 98 % | DIASTOLIC BLOOD PRESSURE: 72 MMHG | SYSTOLIC BLOOD PRESSURE: 112 MMHG | HEART RATE: 70 BPM | RESPIRATION RATE: 14 BRPM

## 2023-04-25 PROCEDURE — 99214 OFFICE O/P EST MOD 30 MIN: CPT

## 2023-04-25 RX ORDER — SYRINGE WITH NEEDLE, 1 ML 25GX5/8"
23G X 1" SYRINGE, EMPTY DISPOSABLE MISCELLANEOUS
Qty: 1 | Refills: 3 | Status: ACTIVE | COMMUNITY
Start: 2019-12-04 | End: 1900-01-01

## 2023-04-25 RX ORDER — NEEDLES, DISPOSABLE 25GX5/8"
18G X 1" NEEDLE, DISPOSABLE MISCELLANEOUS
Qty: 1 | Refills: 3 | Status: ACTIVE | COMMUNITY
Start: 2023-01-30 | End: 1900-01-01

## 2023-04-25 NOTE — REVIEW OF SYSTEMS
[Recent Weight Loss (___ Lbs)] : recent weight loss: [unfilled] lbs [Depression] : depression [All other systems negative] : All other systems negative [Fatigue] : no fatigue [Decreased Appetite] : appetite not decreased [Recent Weight Gain (___ Lbs)] : no recent weight gain [Dysphagia] : no dysphagia [Dysphonia] : no dysphonia [Chest Pain] : no chest pain [Slow Heart Rate] : heart rate is not slow [Palpitations] : no palpitations [Fast Heart Rate] : heart rate is not fast [Shortness Of Breath] : no shortness of breath [Cough] : no cough [Nausea] : no nausea [Constipation] : no constipation [Vomiting] : no vomiting [Diarrhea] : no diarrhea [Headaches] : no headaches [Tremors] : no tremors [Suicidal Ideation] : no suicidal ideation [Anxiety] : no anxiety [de-identified] : States in a tough living situation-lives with brother, denies abuse

## 2023-04-25 NOTE — DATA REVIEWED
[FreeTextEntry1] : Labs 1/2023:\par CBC normal\par \par HDL 41\par Chol 181\par TG 62\par CMP normal except AST 74\par Prolactin 7.9\par Estradiol 17\par Testosterone 502\par \par Labs 11/2020:\par CBC normal\par CMP normal except glucose of 100\par Testosterone 401\par Estradiol 113\par Prolactin 22.1\par TSH 1.80

## 2023-04-25 NOTE — HISTORY OF PRESENT ILLNESS
[FreeTextEntry1] : Call PT: Karen\par Pronouns: She/Her\par \par Karen is a 24 yo transwoman here for follow up. Was started on gender-affirming hormones on February 19, 2017. Patient's endocrinologist was Dr. Mary Ingram @ [place], [place]. \par Karen was started on oral estradiol but had difficulty attaining therapeutic levels of estrogen. She was changed to IM estradiol December 2019\par Seen initially by me 12/2020 after following with Dr. Echevarria. Recommended change bimonthly dosing of estrogen to weekly at 0.5 cc and continue with Spirolactone 300 mg daily, but has been taking 1ml (20mg) every 2 weeks. Missed the last dose due to running out of the medication. Started on progesterone 100mg daily. Since starting the hormones she has noticed some softening of skin, minimal breast growth but has normal erections, no changes in body fat distribution. Denies changes in libido. Has difficulty ejaculation.\par Shaves about the same\par She cant pinpoint what's causing the depression but that "its always there." Has not been following with a mental health professional. Denies any suicidal ideations at this time. \par Gender Affirming Surgeries: Patient is mostly interested in vaginoplasty down the line. \par Sexual Health: Patient has never been sexually active, no penetration or oral. Only kissing. Patient does not want to share who she is romantically and sexually interested in. \par General health: Reports that she has no general health concerns at this time. Feels well overall and has not been ill recently. Does feel tired lately. \par States no new changes in mood, has baseline depression but believes its due to his living situation.  Will not provide details. Denies any abuse, lives with brother\par Reproductive Health: no plans for sperm banking

## 2023-04-25 NOTE — PHYSICAL EXAM
[Alert] : alert [Well Nourished] : well nourished [Healthy Appearance] : healthy appearance [EOMI] : extra ocular movement intact [No Proptosis] : no proptosis [Normal Hearing] : hearing was normal [No Respiratory Distress] : no respiratory distress [No Accessory Muscle Use] : no accessory muscle use [Normal Rate and Effort] : normal respiratory rate and effort [Normal S1, S2] : normal S1 and S2 [Normal Rate] : heart rate was normal [Regular Rhythm] : with a regular rhythm [No Edema] : no peripheral edema [Gynecomastia] : gynecomastia present [Normal Bowel Sounds] : normal bowel sounds [Not Tender] : non-tender [Not Distended] : not distended [Soft] : abdomen soft [No Stigmata of Cushings Syndrome] : no stigmata of Cushings Syndrome [Normal Gait] : normal gait [No Joint Swelling] : no joint swelling seen [Normal Strength/Tone] : muscle strength and tone were normal [No Motor Deficits] : the motor exam was normal [Normal Insight/Judgement] : insight and judgment were intact [de-identified] : flat affect

## 2023-04-25 NOTE — ASSESSMENT
[FreeTextEntry1] : Karen is a 22 yo transwoman here for follow up\par \par 1. Gender affirming therapy\par -despite being on hormones for the past two years, he has not noticed any significant differences in physical appearance.  Does not provide much details regarding changes.  States there is some breast growth but would like more although has not been adherent. \par -Karen is currently on estradiol 10 mg IM every week (was on 20mg every 2 weeks previously), recommended split the dose into a weekly injection dose, which may help, continue with spironolactone 300 \par -continue with trial of progesterone.  Breast cancer, mood swings discussed as possible side effects. If no significant improvement after three month trial can consider stopping\par -Labs to be done once more adherent to hormone regimen.\par -advised mental health follow-up for depression.\par \par Pt. not seen in over 2 years. Prep time with review of labs and interval progress notes and consultations \par Discussion with patient regarding hormone regimen, adherence, management plan, risks and benefits, treatment options and goals of care answering all patients questions and addressing all concerns\par Post-visit completion charting and review\par Total Time 30 min

## 2023-05-03 ENCOUNTER — TRANSCRIPTION ENCOUNTER (OUTPATIENT)
Age: 23
End: 2023-05-03

## 2023-05-03 RX ORDER — SPIRONOLACTONE 100 MG/1
100 TABLET ORAL
Qty: 270 | Refills: 3 | Status: ACTIVE | COMMUNITY
Start: 2018-08-18 | End: 1900-01-01

## 2023-05-08 ENCOUNTER — TRANSCRIPTION ENCOUNTER (OUTPATIENT)
Age: 23
End: 2023-05-08

## 2023-05-08 RX ORDER — PROGESTERONE 100 MG/1
100 CAPSULE ORAL
Qty: 90 | Refills: 3 | Status: ACTIVE | COMMUNITY
Start: 2023-01-30 | End: 1900-01-01

## 2023-07-18 ENCOUNTER — APPOINTMENT (OUTPATIENT)
Dept: TRANSGENDER CARE | Facility: CLINIC | Age: 23
End: 2023-07-18
Payer: COMMERCIAL

## 2023-07-18 ENCOUNTER — LABORATORY RESULT (OUTPATIENT)
Age: 23
End: 2023-07-18

## 2023-07-18 VITALS
WEIGHT: 212 LBS | HEIGHT: 71 IN | OXYGEN SATURATION: 97 % | SYSTOLIC BLOOD PRESSURE: 124 MMHG | HEART RATE: 99 BPM | TEMPERATURE: 98.3 F | BODY MASS INDEX: 29.68 KG/M2 | RESPIRATION RATE: 16 BRPM | DIASTOLIC BLOOD PRESSURE: 70 MMHG

## 2023-07-18 PROCEDURE — 99214 OFFICE O/P EST MOD 30 MIN: CPT

## 2023-07-18 NOTE — PHYSICAL EXAM
[Alert] : alert [Well Nourished] : well nourished [Healthy Appearance] : healthy appearance [EOMI] : extra ocular movement intact [No Proptosis] : no proptosis [Normal Hearing] : hearing was normal [No Respiratory Distress] : no respiratory distress [No Accessory Muscle Use] : no accessory muscle use [Normal Rate and Effort] : normal respiratory rate and effort [Normal S1, S2] : normal S1 and S2 [Normal Rate] : heart rate was normal [Regular Rhythm] : with a regular rhythm [No Edema] : no peripheral edema [Gynecomastia] : gynecomastia present [Normal Bowel Sounds] : normal bowel sounds [Not Tender] : non-tender [Not Distended] : not distended [Soft] : abdomen soft [No Stigmata of Cushings Syndrome] : no stigmata of Cushings Syndrome [Normal Gait] : normal gait [No Joint Swelling] : no joint swelling seen [Normal Strength/Tone] : muscle strength and tone were normal [No Motor Deficits] : the motor exam was normal [Normal Affect] : the affect was normal [Normal Insight/Judgement] : insight and judgment were intact [Normal Mood] : the mood was normal

## 2023-07-18 NOTE — REVIEW OF SYSTEMS
[Fatigue] : no fatigue [Decreased Appetite] : appetite not decreased [Recent Weight Gain (___ Lbs)] : no recent weight gain [Recent Weight Loss (___ Lbs)] : recent weight loss: [unfilled] lbs [Dysphagia] : no dysphagia [Dysphonia] : no dysphonia [Chest Pain] : no chest pain [Slow Heart Rate] : heart rate is not slow [Palpitations] : no palpitations [Fast Heart Rate] : heart rate is not fast [Shortness Of Breath] : no shortness of breath [Cough] : no cough [Nausea] : no nausea [Constipation] : no constipation [Vomiting] : no vomiting [Diarrhea] : no diarrhea [Headaches] : no headaches [Tremors] : no tremors [Depression] : depression [Suicidal Ideation] : no suicidal ideation [Anxiety] : no anxiety [All other systems negative] : All other systems negative [de-identified] : States in a tough living situation-lives with brother, denies abuse

## 2023-07-18 NOTE — ASSESSMENT
[FreeTextEntry1] : Karen is a 24 yo transwoman here for follow up\par \par 1. Gender affirming therapy\par -despite being on hormones for the past two years, he has not noticed any significant differences in physical appearance.  Does not provide much details regarding changes.  States there is some breast growth but would like more although has not been adherent. \par -Karen is currently on estradiol 10 mg IM every week (was on 20mg every 2 weeks previously), recommended split the dose into a weekly injection dose, which may help, Check levels today on 0.5ml weekly. Last dose 5 days ago. \par -continue with spironolactone 300. Check CMP\par -continue with trial of progesterone.  Breast cancer, mood swings discussed as possible side effects. If no significant improvement after three month trial can consider stopping\par -Labs to be done once more adherent to hormone regimen.\par -advised mental health follow-up for depression.\par \par Prep time with review of labs and interval progress notes and consultations \par Discussion with patient regarding hormone regimen, adherence, management plan, risks and benefits, treatment options and goals of care answering all patients questions and addressing all concerns\par Post-visit completion charting and review\par Total Time 30 min

## 2023-07-18 NOTE — HISTORY OF PRESENT ILLNESS
[FreeTextEntry1] : Call PT: Karen\par Pronouns: She/Her\par \par Karen is a 24 yo transwoman here for follow up. Was started on gender-affirming hormones on February 19, 2017. Patient's endocrinologist was Dr. Mary Ingram @ [place], [place]. \par Karen was started on oral estradiol but had difficulty attaining therapeutic levels of estrogen. She was changed to IM estradiol December 2019\par Seen initially by me 12/2020 after following with Dr. Echevarria. Recommended change bimonthly dosing of estrogen to weekly at 0.5 cc and continue with Spirolactone 300 mg daily. Started on progesterone 100mg daily. Last dose 5 day ago. Since starting the hormones she has noticed some softening of skin, minimal breast growth, no spontaneous erections, no ejaculation, no changes in body fat distribution. Denies changes in libido. \par Shaves about the same\par She cant pinpoint what's causing the depression but that "its always there." Has not been following with a mental health professional. Denies any suicidal ideations at this time. \par Gender Affirming Surgeries: Patient is mostly interested in vaginoplasty down the line. \par Sexual Health: Patient has never been sexually active, no penetration or oral. Only kissing. Patient does not want to share who she is romantically and sexually interested in. \par General health: Reports that she has no general health concerns at this time. Feels well overall and has not been ill recently. Does feel tired lately. \par States no new changes in mood, has baseline depression but believes its due to his living situation.  Will not provide details. Denies any abuse, lives with brother\par Reproductive Health: no plans for sperm banking\par Started on Descovy for HIV prevention.

## 2023-07-20 LAB
ALBUMIN SERPL ELPH-MCNC: 4.5 G/DL
ALP BLD-CCNC: 46 U/L
ALT SERPL-CCNC: 11 U/L
ANION GAP SERPL CALC-SCNC: 17 MMOL/L
AST SERPL-CCNC: 17 U/L
BILIRUB SERPL-MCNC: 0.6 MG/DL
BUN SERPL-MCNC: 9 MG/DL
CALCIUM SERPL-MCNC: 9.5 MG/DL
CHLORIDE SERPL-SCNC: 105 MMOL/L
CHOLEST SERPL-MCNC: 177 MG/DL
CO2 SERPL-SCNC: 18 MMOL/L
CREAT SERPL-MCNC: 0.93 MG/DL
EGFR: 118 ML/MIN/1.73M2
ESTRADIOL SERPL-MCNC: 237 PG/ML
GLUCOSE SERPL-MCNC: 98 MG/DL
HDLC SERPL-MCNC: 54 MG/DL
LDLC SERPL CALC-MCNC: 107 MG/DL
NONHDLC SERPL-MCNC: 123 MG/DL
POTASSIUM SERPL-SCNC: 3.7 MMOL/L
PROT SERPL-MCNC: 6.8 G/DL
SODIUM SERPL-SCNC: 141 MMOL/L
TESTOST SERPL-MCNC: 11.8 NG/DL
TRIGL SERPL-MCNC: 90 MG/DL

## 2023-10-02 NOTE — PHYSICAL EXAM
Received post procedure to [facility] to room 9. Assessment obtained. Restrictions reviewed with patient. Post procedure pathway initiated. Family at side. Patient without complaints.  Procedure cancelled [No Acute Distress] : no acute distress [Alert] : alert [Well Developed] : well developed [Well Nourished] : well nourished [Normal Hearing] : hearing was normal [EOMI] : extra ocular movement intact [No Respiratory Distress] : no respiratory distress [Normal Rate and Effort] : normal respiratory rhythm and effort [No Accessory Muscle Use] : no accessory muscle use [Normal Rate] : heart rate was normal  [Clear to Auscultation] : lungs were clear to auscultation bilaterally [Normal S1, S2] : normal S1 and S2 [Regular Rhythm] : with a regular rhythm [Normal Bowel Sounds] : normal bowel sounds [Soft] : abdomen soft [Not Tender] : non-tender [Normal Strength/Tone] : muscle strength and tone were normal [Normal Gait] : normal gait [No Joint Swelling] : no joint swelling seen [No Rash] : no rash [No Motor Deficits] : the motor exam was normal [No Tremors] : no tremors [Normal Insight/Judgement] : insight and judgment were intact [Normal Affect] : the affect was normal [Normal Mood] : the mood was normal

## 2023-10-24 ENCOUNTER — APPOINTMENT (OUTPATIENT)
Dept: TRANSGENDER CARE | Facility: CLINIC | Age: 23
End: 2023-10-24
Payer: COMMERCIAL

## 2023-10-24 VITALS
HEIGHT: 71 IN | TEMPERATURE: 98.2 F | SYSTOLIC BLOOD PRESSURE: 110 MMHG | OXYGEN SATURATION: 98 % | DIASTOLIC BLOOD PRESSURE: 74 MMHG | HEART RATE: 72 BPM

## 2023-10-24 PROCEDURE — 99214 OFFICE O/P EST MOD 30 MIN: CPT

## 2023-10-25 LAB
ALBUMIN SERPL ELPH-MCNC: 4.3 G/DL
ALP BLD-CCNC: 45 U/L
ALT SERPL-CCNC: 8 U/L
ANION GAP SERPL CALC-SCNC: 13 MMOL/L
AST SERPL-CCNC: 13 U/L
BILIRUB SERPL-MCNC: 0.2 MG/DL
BUN SERPL-MCNC: 12 MG/DL
CALCIUM SERPL-MCNC: 9.1 MG/DL
CHLORIDE SERPL-SCNC: 106 MMOL/L
CO2 SERPL-SCNC: 22 MMOL/L
CREAT SERPL-MCNC: 0.83 MG/DL
EGFR: 125 ML/MIN/1.73M2
ESTRADIOL SERPL-MCNC: 254 PG/ML
GLUCOSE SERPL-MCNC: 89 MG/DL
POTASSIUM SERPL-SCNC: 3.8 MMOL/L
PROT SERPL-MCNC: 6.7 G/DL
SODIUM SERPL-SCNC: 141 MMOL/L
TESTOST SERPL-MCNC: 28.1 NG/DL

## 2023-10-31 ENCOUNTER — APPOINTMENT (OUTPATIENT)
Dept: TRANSGENDER CARE | Facility: CLINIC | Age: 23
End: 2023-10-31
Payer: COMMERCIAL

## 2023-10-31 VITALS
HEART RATE: 70 BPM | TEMPERATURE: 98.1 F | SYSTOLIC BLOOD PRESSURE: 108 MMHG | OXYGEN SATURATION: 98 % | HEIGHT: 71 IN | DIASTOLIC BLOOD PRESSURE: 68 MMHG | BODY MASS INDEX: 33.04 KG/M2 | WEIGHT: 236 LBS

## 2023-10-31 PROCEDURE — 36415 COLL VENOUS BLD VENIPUNCTURE: CPT

## 2023-10-31 PROCEDURE — 90651 9VHPV VACCINE 2/3 DOSE IM: CPT

## 2023-10-31 PROCEDURE — 90472 IMMUNIZATION ADMIN EACH ADD: CPT

## 2023-10-31 PROCEDURE — 99215 OFFICE O/P EST HI 40 MIN: CPT | Mod: 25

## 2023-10-31 PROCEDURE — 90471 IMMUNIZATION ADMIN: CPT

## 2023-10-31 PROCEDURE — 90739 HEPB VACC 2/4 DOSE ADULT IM: CPT

## 2023-11-01 LAB
ALBUMIN SERPL ELPH-MCNC: 4.7 G/DL
ALP BLD-CCNC: 45 U/L
ALT SERPL-CCNC: 8 U/L
ANION GAP SERPL CALC-SCNC: 14 MMOL/L
APPEARANCE: CLEAR
AST SERPL-CCNC: 11 U/L
BASOPHILS # BLD AUTO: 0.06 K/UL
BASOPHILS NFR BLD AUTO: 0.6 %
BILIRUB SERPL-MCNC: 0.4 MG/DL
BILIRUBIN URINE: NEGATIVE
BLOOD URINE: NEGATIVE
BUN SERPL-MCNC: 11 MG/DL
C TRACH RRNA SPEC QL NAA+PROBE: NOT DETECTED
CALCIUM SERPL-MCNC: 9.6 MG/DL
CHLORIDE SERPL-SCNC: 105 MMOL/L
CO2 SERPL-SCNC: 20 MMOL/L
COLOR: YELLOW
CREAT SERPL-MCNC: 0.86 MG/DL
EGFR: 124 ML/MIN/1.73M2
EOSINOPHIL # BLD AUTO: 0.15 K/UL
EOSINOPHIL NFR BLD AUTO: 1.6 %
GLUCOSE QUALITATIVE U: NEGATIVE MG/DL
GLUCOSE SERPL-MCNC: 97 MG/DL
HCT VFR BLD CALC: 40.9 %
HGB BLD-MCNC: 14.1 G/DL
HIV1 RNA # SERPL NAA+PROBE: NORMAL
HIV1 RNA # SERPL NAA+PROBE: NORMAL COPIES/ML
HIV1+2 AB SPEC QL IA.RAPID: NONREACTIVE
IMM GRANULOCYTES NFR BLD AUTO: 0.5 %
KETONES URINE: NEGATIVE MG/DL
LEUKOCYTE ESTERASE URINE: NEGATIVE
LYMPHOCYTES # BLD AUTO: 2.16 K/UL
LYMPHOCYTES NFR BLD AUTO: 23 %
MAN DIFF?: NORMAL
MCHC RBC-ENTMCNC: 30.4 PG
MCHC RBC-ENTMCNC: 34.5 GM/DL
MCV RBC AUTO: 88.1 FL
MONOCYTES # BLD AUTO: 0.52 K/UL
MONOCYTES NFR BLD AUTO: 5.5 %
N GONORRHOEA RRNA SPEC QL NAA+PROBE: NOT DETECTED
NEUTROPHILS # BLD AUTO: 6.44 K/UL
NEUTROPHILS NFR BLD AUTO: 68.8 %
NITRITE URINE: NEGATIVE
PH URINE: 6
PLATELET # BLD AUTO: 232 K/UL
POTASSIUM SERPL-SCNC: 4.1 MMOL/L
PROT SERPL-MCNC: 7.1 G/DL
PROTEIN URINE: NEGATIVE MG/DL
RBC # BLD: 4.64 M/UL
RBC # FLD: 12.6 %
SODIUM SERPL-SCNC: 139 MMOL/L
SOURCE AMPLIFICATION: NORMAL
SOURCE ANAL: NORMAL
SOURCE ORAL: NORMAL
SPECIFIC GRAVITY URINE: 1.02
T PALLIDUM AB SER QL IA: NEGATIVE
UROBILINOGEN URINE: 0.2 MG/DL
VIRAL LOAD INTERP: NORMAL
VIRAL LOAD LOG: NORMAL LG COP/ML
WBC # FLD AUTO: 9.38 K/UL

## 2023-11-28 ENCOUNTER — APPOINTMENT (OUTPATIENT)
Dept: TRANSGENDER CARE | Facility: CLINIC | Age: 23
End: 2023-11-28

## 2023-11-28 ENCOUNTER — APPOINTMENT (OUTPATIENT)
Dept: TRANSGENDER CARE | Facility: CLINIC | Age: 23
End: 2023-11-28
Payer: COMMERCIAL

## 2023-11-28 VITALS
SYSTOLIC BLOOD PRESSURE: 112 MMHG | TEMPERATURE: 97.8 F | WEIGHT: 236 LBS | RESPIRATION RATE: 16 BRPM | BODY MASS INDEX: 33.04 KG/M2 | DIASTOLIC BLOOD PRESSURE: 70 MMHG | HEIGHT: 71 IN | HEART RATE: 70 BPM

## 2023-11-28 PROCEDURE — 90471 IMMUNIZATION ADMIN: CPT

## 2023-11-28 PROCEDURE — 90472 IMMUNIZATION ADMIN EACH ADD: CPT

## 2023-11-28 PROCEDURE — 90739 HEPB VACC 2/4 DOSE ADULT IM: CPT

## 2023-11-28 PROCEDURE — 90651 9VHPV VACCINE 2/3 DOSE IM: CPT

## 2024-01-30 ENCOUNTER — APPOINTMENT (OUTPATIENT)
Dept: TRANSGENDER CARE | Facility: CLINIC | Age: 24
End: 2024-01-30
Payer: COMMERCIAL

## 2024-01-30 VITALS
DIASTOLIC BLOOD PRESSURE: 78 MMHG | BODY MASS INDEX: 36.88 KG/M2 | HEIGHT: 69 IN | SYSTOLIC BLOOD PRESSURE: 122 MMHG | OXYGEN SATURATION: 96 % | TEMPERATURE: 98.5 F | HEART RATE: 109 BPM | WEIGHT: 249 LBS

## 2024-01-30 DIAGNOSIS — Z29.81 ENCOUNTER FOR HIV PRE-EXPOSURE PROPHYLAXIS: ICD-10-CM

## 2024-01-30 DIAGNOSIS — R30.0 DYSURIA: ICD-10-CM

## 2024-01-30 PROCEDURE — 99215 OFFICE O/P EST HI 40 MIN: CPT

## 2024-01-30 PROCEDURE — 36415 COLL VENOUS BLD VENIPUNCTURE: CPT

## 2024-01-30 PROCEDURE — G2211 COMPLEX E/M VISIT ADD ON: CPT

## 2024-01-30 RX ORDER — EMTRICITABINE AND TENOFOVIR DISOPROXIL FUMARATE 200; 300 MG/1; MG/1
200-300 TABLET, FILM COATED ORAL
Qty: 90 | Refills: 0 | Status: ACTIVE | COMMUNITY
Start: 2023-10-31 | End: 1900-01-01

## 2024-01-30 NOTE — ASSESSMENT
[FreeTextEntry1] : #Depression, memory issues Multifactorial - does not appear to be chemical/organic in nature; suspect related to mental health although can not rule out contribution by GAHT. Patient reports that they felt better when on antidepressants and ADHD meds and felt this alleviated some of these symptoms as well. Patient would like to trial restarting sertraline, and consider xr stimulant meds vs wellbutrin in the future   #Dysuria Prior urine studies entirely WNL. Chronic. Discussed keeping a symptom diary to assess for possible dietary triggers. Remain well hydrated. Consider urology referral if ongoing.   #GAC  Continue as per endocrine  #Sexual health prEP refilled   The [employer] Safe Sex Evidence Based Intervention was applied and a positive reinforcement of safe behavior was provided.

## 2024-01-30 NOTE — HISTORY OF PRESENT ILLNESS
[de-identified] : Here for PrEP f/u  They are on hormones per Dr Dior  Has burning with urination for many years  Chronic mild irritation Can not identify trigger No pain with sexual practices  Pain is in shaft  Declines urology referral right now   Has been having a few years of memory loss - short term and long term Does not remember days prior or earlier in the day Puts stuff down and forgets where it is  Had Covid in around Christmas 2020, unsure if symptom onset began around this time  Denies issues in school, but lost ability to focus in high school  Identifies mental health as being very bad - has been on antidepressants and ADHD meds in the past   PrEP: Very rarely will miss a dose Zero partners / sexual activity since last visit

## 2024-01-31 LAB
ALBUMIN SERPL ELPH-MCNC: 4.9 G/DL
ALP BLD-CCNC: 50 U/L
ALT SERPL-CCNC: 19 U/L
ANION GAP SERPL CALC-SCNC: 10 MMOL/L
APPEARANCE: CLEAR
AST SERPL-CCNC: 22 U/L
BASOPHILS # BLD AUTO: 0.04 K/UL
BASOPHILS NFR BLD AUTO: 0.3 %
BILIRUB SERPL-MCNC: 0.6 MG/DL
BILIRUBIN URINE: NEGATIVE
BLOOD URINE: NEGATIVE
BUN SERPL-MCNC: 14 MG/DL
C TRACH RRNA SPEC QL NAA+PROBE: NOT DETECTED
CALCIUM SERPL-MCNC: 9.3 MG/DL
CHLORIDE SERPL-SCNC: 103 MMOL/L
CO2 SERPL-SCNC: 25 MMOL/L
COLOR: NORMAL
CREAT SERPL-MCNC: 1.02 MG/DL
EGFR: 105 ML/MIN/1.73M2
EOSINOPHIL # BLD AUTO: 0.22 K/UL
EOSINOPHIL NFR BLD AUTO: 1.8 %
GLUCOSE QUALITATIVE U: NEGATIVE MG/DL
GLUCOSE SERPL-MCNC: 88 MG/DL
HCT VFR BLD CALC: 42.1 %
HGB BLD-MCNC: 14.5 G/DL
HIV1 RNA # SERPL NAA+PROBE: NORMAL
HIV1 RNA # SERPL NAA+PROBE: NORMAL COPIES/ML
HIV1+2 AB SPEC QL IA.RAPID: NONREACTIVE
IMM GRANULOCYTES NFR BLD AUTO: 0.5 %
KETONES URINE: NEGATIVE MG/DL
LEUKOCYTE ESTERASE URINE: NEGATIVE
LYMPHOCYTES # BLD AUTO: 2.21 K/UL
LYMPHOCYTES NFR BLD AUTO: 18.4 %
MAN DIFF?: NORMAL
MCHC RBC-ENTMCNC: 31 PG
MCHC RBC-ENTMCNC: 34.4 GM/DL
MCV RBC AUTO: 90.1 FL
MONOCYTES # BLD AUTO: 0.64 K/UL
MONOCYTES NFR BLD AUTO: 5.3 %
N GONORRHOEA RRNA SPEC QL NAA+PROBE: NOT DETECTED
NEUTROPHILS # BLD AUTO: 8.87 K/UL
NEUTROPHILS NFR BLD AUTO: 73.7 %
NITRITE URINE: NEGATIVE
PH URINE: 5.5
PLATELET # BLD AUTO: 289 K/UL
POTASSIUM SERPL-SCNC: 4 MMOL/L
PROT SERPL-MCNC: 6.9 G/DL
PROTEIN URINE: NEGATIVE MG/DL
RBC # BLD: 4.67 M/UL
RBC # FLD: 12.7 %
SODIUM SERPL-SCNC: 138 MMOL/L
SOURCE AMPLIFICATION: NORMAL
SOURCE ANAL: NORMAL
SOURCE ORAL: NORMAL
SPECIFIC GRAVITY URINE: 1.03
T PALLIDUM AB SER QL IA: NEGATIVE
UROBILINOGEN URINE: 0.2 MG/DL
VIRAL LOAD INTERP: NORMAL
VIRAL LOAD LOG: NORMAL LG COP/ML
WBC # FLD AUTO: 12.04 K/UL

## 2024-02-13 ENCOUNTER — APPOINTMENT (OUTPATIENT)
Dept: TRANSGENDER CARE | Facility: CLINIC | Age: 24
End: 2024-02-13
Payer: COMMERCIAL

## 2024-02-13 DIAGNOSIS — F64.0 TRANSSEXUALISM: ICD-10-CM

## 2024-02-13 PROCEDURE — 99214 OFFICE O/P EST MOD 30 MIN: CPT

## 2024-02-13 PROCEDURE — G2211 COMPLEX E/M VISIT ADD ON: CPT

## 2024-02-13 NOTE — DATA REVIEWED
[FreeTextEntry1] : Labs 1/2024: CMP normal  Labs 10/2023: Testosterone 28.1 Estradiol 254  Labs 7/2023: Estradiol 237 Testosterone 11.8 TG 90 Chol 177 HDL 54  CMP normal CBC normal  Labs 1/2023: CBC normal  HDL 41 Chol 181 TG 62 CMP normal except AST 74 Prolactin 7.9 Estradiol 17 Testosterone 502  Labs 11/2020: CBC normal CMP normal except glucose of 100 Testosterone 401 Estradiol 113 Prolactin 22.1 TSH 1.80

## 2024-02-13 NOTE — HISTORY OF PRESENT ILLNESS
[Home] : at home, [unfilled] , at the time of the visit. [Medical Office: ([facility])___] : at the medical office located in  [Verbal consent obtained from patient] : the patient, [unfilled] [FreeTextEntry1] : Call PT: Karen Pronouns: She/Her  Karen is a 22 yo transwoman here for follow up. Was started on gender-affirming hormones on February 19, 2017. Patient's endocrinologist was Dr. Mary Ingram @ [place], [place].  Karen was started on oral estradiol but had difficulty attaining therapeutic levels of estrogen. She was changed to IM estradiol December 2019 Seen initially by me 12/2020 after following with Dr. Echevarria. Recommended change bimonthly dosing of estrogen to weekly at 0.5 cc and continue with Spironolactone 300 mg daily. Estrogen dose decreased to 0.4ml weekly (8mg weekly) 7/2023 for trough level of 237 pg/mL. Last dose 6 days ago. Also on progesterone 100mg daily. Since starting the hormones she has noticed some softening of skin, minimal breast growth, no spontaneous erections, no ejaculation, no changes in body fat distribution. Denies changes in libido.  Shaves about the same. Has noticed more fatigue.  She cant pinpoint what's causing the depression but that "its always there." Has not been following with a mental health professional. Denies any suicidal ideations at this time.  Started on antidepressants with some improvement.  Gender Affirming Surgeries: Patient is mostly interested in vaginoplasty down the line.  Sexual Health: Patient has never been sexually active, no penetration or oral. Only kissing. Patient does not want to share who she is romantically and sexually interested in.  General health: Reports that she has no general health concerns at this time. Feels well overall and has not been ill recently. Does feel tired lately.  States no new changes in mood, has baseline depression but believes its due to his living situation.  Will not provide details. Denies any abuse, lives with brother Reproductive Health: no plans for sperm banking Started on Descovy for HIV prevention.

## 2024-02-13 NOTE — PHYSICAL EXAM
[Alert] : alert [Well Nourished] : well nourished [Healthy Appearance] : healthy appearance [EOMI] : extra ocular movement intact [No Proptosis] : no proptosis [Normal Hearing] : hearing was normal [No Respiratory Distress] : no respiratory distress [No Accessory Muscle Use] : no accessory muscle use [Normal Rate and Effort] : normal respiratory rate and effort [Normal S1, S2] : normal S1 and S2 [Normal Rate] : heart rate was normal [Regular Rhythm] : with a regular rhythm [No Edema] : no peripheral edema [Gynecomastia] : gynecomastia present [Normal Bowel Sounds] : normal bowel sounds [Not Tender] : non-tender [Not Distended] : not distended [Soft] : abdomen soft [No Stigmata of Cushings Syndrome] : no stigmata of Cushings Syndrome [Normal Gait] : normal gait [No Joint Swelling] : no joint swelling seen [Normal Strength/Tone] : muscle strength and tone were normal [Normal Affect] : the affect was normal [No Motor Deficits] : the motor exam was normal [Normal Insight/Judgement] : insight and judgment were intact [Normal Mood] : the mood was normal

## 2024-02-13 NOTE — REVIEW OF SYSTEMS
[Fatigue] : no fatigue [Decreased Appetite] : appetite not decreased [Recent Weight Gain (___ Lbs)] : no recent weight gain [Recent Weight Loss (___ Lbs)] : recent weight loss: [unfilled] lbs [Dysphagia] : no dysphagia [Chest Pain] : no chest pain [Dysphonia] : no dysphonia [Slow Heart Rate] : heart rate is not slow [Palpitations] : no palpitations [Fast Heart Rate] : heart rate is not fast [Shortness Of Breath] : no shortness of breath [Cough] : no cough [Constipation] : no constipation [Nausea] : no nausea [Vomiting] : no vomiting [Diarrhea] : no diarrhea [Headaches] : no headaches [Tremors] : no tremors [Depression] : depression [Suicidal Ideation] : no suicidal ideation [Anxiety] : no anxiety [All other systems negative] : All other systems negative [de-identified] : States in a tough living situation-lives with brother, denies abuse

## 2024-03-12 ENCOUNTER — LABORATORY RESULT (OUTPATIENT)
Age: 24
End: 2024-03-12

## 2024-03-12 ENCOUNTER — APPOINTMENT (OUTPATIENT)
Dept: TRANSGENDER CARE | Facility: CLINIC | Age: 24
End: 2024-03-12
Payer: COMMERCIAL

## 2024-03-12 VITALS
DIASTOLIC BLOOD PRESSURE: 84 MMHG | SYSTOLIC BLOOD PRESSURE: 116 MMHG | HEIGHT: 69 IN | OXYGEN SATURATION: 97 % | WEIGHT: 236 LBS | TEMPERATURE: 98.2 F | HEART RATE: 84 BPM | BODY MASS INDEX: 34.96 KG/M2

## 2024-03-12 PROCEDURE — G2211 COMPLEX E/M VISIT ADD ON: CPT

## 2024-03-12 PROCEDURE — 99215 OFFICE O/P EST HI 40 MIN: CPT

## 2024-03-12 PROCEDURE — 36415 COLL VENOUS BLD VENIPUNCTURE: CPT

## 2024-03-12 NOTE — ASSESSMENT
[FreeTextEntry1] : #Major depression, sustained Continue sertraline 100 mg Start buproprion 150 mg QD, can increase to 300 mg daily after 1-2 weeks if well tolerated RTC 4-6 weeks sooner if needed  #GAC labs today plastics consult placed  #Sexual health Discontinue PrEP for now  The [employer] for [employer] Safe Sex Evidence Based Intervention was applied and a positive reinforcement of safe behavior was provided.

## 2024-03-12 NOTE — HISTORY OF PRESENT ILLNESS
[de-identified] : Here to f/u  Started sertraline again, says "I feel good and bad, less suicidal, but more emotionally unstable." "I have gone from a 1 to 3 on a scale of 10" Is open to trying other med management today. Had been on Vyvanse before.  Is interested in therapy referrals in the future but not right now  due for estradiol inj tonight - wants to do labs today  Thinking of stopping PrEP as has been less sexually active  Interested in vaginoplasty - requesting referral

## 2024-03-13 LAB
ALBUMIN SERPL ELPH-MCNC: 4.5 G/DL
ALP BLD-CCNC: 58 U/L
ALT SERPL-CCNC: 12 U/L
ANION GAP SERPL CALC-SCNC: 14 MMOL/L
AST SERPL-CCNC: 17 U/L
BASOPHILS # BLD AUTO: 0.04 K/UL
BASOPHILS NFR BLD AUTO: 0.5 %
BILIRUB SERPL-MCNC: 0.2 MG/DL
BUN SERPL-MCNC: 15 MG/DL
CALCIUM SERPL-MCNC: 9.3 MG/DL
CHLORIDE SERPL-SCNC: 106 MMOL/L
CHOLEST SERPL-MCNC: 141 MG/DL
CO2 SERPL-SCNC: 19 MMOL/L
CREAT SERPL-MCNC: 1.02 MG/DL
EGFR: 105 ML/MIN/1.73M2
EOSINOPHIL # BLD AUTO: 0.15 K/UL
EOSINOPHIL NFR BLD AUTO: 2 %
ESTIMATED AVERAGE GLUCOSE: 88 MG/DL
ESTRADIOL SERPL-MCNC: 236 PG/ML
GLUCOSE SERPL-MCNC: 86 MG/DL
HBA1C MFR BLD HPLC: 4.7 %
HCT VFR BLD CALC: 40.7 %
HDLC SERPL-MCNC: 56 MG/DL
HGB BLD-MCNC: 14.1 G/DL
IMM GRANULOCYTES NFR BLD AUTO: 0.4 %
LDLC SERPL CALC-MCNC: 72 MG/DL
LH SERPL-ACNC: <0.3 IU/L
LYMPHOCYTES # BLD AUTO: 2.8 K/UL
LYMPHOCYTES NFR BLD AUTO: 37.7 %
MAN DIFF?: NORMAL
MCHC RBC-ENTMCNC: 30.4 PG
MCHC RBC-ENTMCNC: 34.6 GM/DL
MCV RBC AUTO: 87.7 FL
MONOCYTES # BLD AUTO: 0.47 K/UL
MONOCYTES NFR BLD AUTO: 6.3 %
NEUTROPHILS # BLD AUTO: 3.94 K/UL
NEUTROPHILS NFR BLD AUTO: 53.1 %
NONHDLC SERPL-MCNC: 85 MG/DL
PLATELET # BLD AUTO: 285 K/UL
POTASSIUM SERPL-SCNC: 3.9 MMOL/L
PROT SERPL-MCNC: 7.2 G/DL
RBC # BLD: 4.64 M/UL
RBC # FLD: 12.4 %
SODIUM SERPL-SCNC: 140 MMOL/L
TESTOST SERPL-MCNC: 13.3 NG/DL
TRIGL SERPL-MCNC: 62 MG/DL
TSH SERPL-ACNC: 4.98 UIU/ML
WBC # FLD AUTO: 7.43 K/UL

## 2024-04-23 ENCOUNTER — APPOINTMENT (OUTPATIENT)
Dept: TRANSGENDER CARE | Facility: CLINIC | Age: 24
End: 2024-04-23
Payer: COMMERCIAL

## 2024-04-23 VITALS
WEIGHT: 254 LBS | DIASTOLIC BLOOD PRESSURE: 74 MMHG | TEMPERATURE: 98.7 F | SYSTOLIC BLOOD PRESSURE: 130 MMHG | OXYGEN SATURATION: 97 % | HEART RATE: 86 BPM | BODY MASS INDEX: 37.62 KG/M2 | HEIGHT: 69 IN

## 2024-04-23 DIAGNOSIS — F32.A DEPRESSION, UNSPECIFIED: ICD-10-CM

## 2024-04-23 DIAGNOSIS — F64.9 GENDER IDENTITY DISORDER, UNSPECIFIED: ICD-10-CM

## 2024-04-23 PROCEDURE — 99215 OFFICE O/P EST HI 40 MIN: CPT

## 2024-04-23 PROCEDURE — G2211 COMPLEX E/M VISIT ADD ON: CPT

## 2024-04-23 RX ORDER — SERTRALINE HYDROCHLORIDE 50 MG/1
50 TABLET, FILM COATED ORAL
Qty: 180 | Refills: 2 | Status: ACTIVE | COMMUNITY
Start: 2024-01-30 | End: 1900-01-01

## 2024-04-23 RX ORDER — BUPROPION HYDROCHLORIDE 150 MG/1
150 TABLET, EXTENDED RELEASE ORAL DAILY
Qty: 180 | Refills: 2 | Status: ACTIVE | COMMUNITY
Start: 2024-03-12 | End: 1900-01-01

## 2024-04-23 RX ORDER — ESTRADIOL VALERATE 20 MG/ML
20 INJECTION INTRAMUSCULAR WEEKLY
Qty: 1 | Refills: 2 | Status: ACTIVE | COMMUNITY
Start: 2019-12-04 | End: 1900-01-01

## 2024-04-23 NOTE — HISTORY OF PRESENT ILLNESS
[de-identified] : Here to f/u Is now taking 100 mg sertraline, 300 mg wellbutrin Had some bad dreams at the beginning  Unsure if mental health is better, feels around the same, but having a particularly bad week  Still not very interested in therapy, just doesnt like talking to people about problems  Does not think wellbutrin has been very helpful for attention issues, maybe minorly  Denies biotin or energy drink use  Estradiol was over goal at last visit, and T quite low - was on 0.4 mL and 300 mg cathy, will decrease to 0.3 and 200 mg  Takes all meds at the same time - does not divide cathy dose  Going to visit aunt in [place], dad in [place] this summer  Not currently working, had been working at [employer], looking to get a job again soon

## 2024-04-23 NOTE — ASSESSMENT
[FreeTextEntry1] : #MDD would like to stay current course for a few more months and see how it goes. Patient endorses significant cannabis use which is likely contributing to difficulty assessing efficacy of medication; recommend decrease or cease intake at least temporarily in order to provide a more clear picture of efficacy. Patient will consider. Also with very low T, permissive elevation of T may help with mood and energy, patient amenable to decrease estradiol to 0.3 mg and spironolactone to 200 mg and see if this makes any difference for them. Amenable to plan, RTC 3-6 months, sooner PRN

## 2024-07-25 ENCOUNTER — RX RENEWAL (OUTPATIENT)
Age: 24
End: 2024-07-25

## 2024-07-25 RX ORDER — ISOPROPYL ALCOHOL 0.75 G/1
SWAB TOPICAL
Qty: 100 | Refills: 0 | Status: ACTIVE | COMMUNITY
Start: 2024-07-25 | End: 1900-01-01

## 2024-07-30 ENCOUNTER — TRANSCRIPTION ENCOUNTER (OUTPATIENT)
Age: 24
End: 2024-07-30

## 2024-08-21 ENCOUNTER — APPOINTMENT (OUTPATIENT)
Dept: PLASTIC SURGERY | Facility: CLINIC | Age: 24
End: 2024-08-21
Payer: COMMERCIAL

## 2024-08-21 VITALS
HEIGHT: 70 IN | SYSTOLIC BLOOD PRESSURE: 123 MMHG | BODY MASS INDEX: 40.09 KG/M2 | HEART RATE: 85 BPM | WEIGHT: 280 LBS | TEMPERATURE: 97.7 F | DIASTOLIC BLOOD PRESSURE: 70 MMHG

## 2024-08-21 DIAGNOSIS — F64.0 TRANSSEXUALISM: ICD-10-CM

## 2024-08-21 PROCEDURE — 99204 OFFICE O/P NEW MOD 45 MIN: CPT

## 2024-08-21 NOTE — PHYSICAL EXAM
[de-identified] : NAD. BMI 40.2. Relatively muscular body habitus. [de-identified] : Normal respiratory effort. [de-identified] : Genital exam was performed with a medical chaperone present (DM). There are no palpable testicular masses present. There are no palpable hernias. There are no visible skin lesions. They are uncircumcised. The prepuce is not completely retractable. There appears to be sufficient available local tissue for this patient's gender goals.

## 2024-08-21 NOTE — ASSESSMENT
[FreeTextEntry1] : The patient is a reasonable candidate for a robot-assisted penile inversion vaginoplasty with Dr. Montenegro in collaboration with Dr. Hassan. They will need 1 mental health letter of assessment prior to submitting for insurance authorization. However, they need to begin hair removal. They will need to determine their post-operative support plan and financial stability.  Recommend follow up in 6 months.    Depending on progress at the 6 month appointment, they will also need to meet with Dr. Hassan in order to proceed.  I, Dr. Montenegro, personally performed the evaluation and management (E/M) services for this new patient. That E/M includes conducting the clinically appropriate initial history &/or exam, assessing all conditions, and establishing the plan of care. Today, my XIOMARA, Radames Aguilar PA-C, was here to observe my evaluation and management service for this patient & follow plan of care established by me going forward.

## 2024-08-21 NOTE — PHYSICAL EXAM
[de-identified] : NAD. BMI 40.2. Relatively muscular body habitus. [de-identified] : Normal respiratory effort. [de-identified] : Genital exam was performed with a medical chaperone present (DM). There are no palpable testicular masses present. There are no palpable hernias. There are no visible skin lesions. They are uncircumcised. The prepuce is not completely retractable. There appears to be sufficient available local tissue for this patient's gender goals.

## 2024-08-21 NOTE — HISTORY OF PRESENT ILLNESS
[FreeTextEntry1] : 25yo nonbinary person designated male at birth (Karen or Jonathon; they/them) presents for consultation for vaginoplasty. They are primarily interested in vaginal receptive intercourse with a penis. They have been on feminizing hormones for about 6 years. They don't tuck. They have not had sperm stored for fertility preservation and expresses understanding that no additional sperm would be able to be retrievable following vaginoplasty and orchiectomy. They are capable of orgasm. They deny any hernias or masses. They are uncircumcised and has not had any genital operations. They have not undergone any type of hair removal on their genitals. They deny any history of DVT/PE. They also deny any history of issues with general anesthesia.  Patient is currently unemployed looking for work. They are currently supporting themselves financially with savings at the moment. Lives with brother. Patient has not disclosed identity or surgical plans with brother. They will need to do this in order to proceed or determine a different support plan. Endorses marijuana use. The patient agreed to avoid all marijuana smoking for 6 weeks before surgery and 6 weeks after surgery and to avoid all THC use for 2 weeks before and after surgery. Denies tobacco use, rare alcohol use, and denies any other recreational drug use. Patient denies any history of psychiatric hospitalization or ER admission.

## 2024-08-21 NOTE — HISTORY OF PRESENT ILLNESS
[FreeTextEntry1] : 23yo nonbinary person designated male at birth (Karen or Jonathon; they/them) presents for consultation for vaginoplasty. They are primarily interested in vaginal receptive intercourse with a penis. They have been on feminizing hormones for about 6 years. They don't tuck. They have not had sperm stored for fertility preservation and expresses understanding that no additional sperm would be able to be retrievable following vaginoplasty and orchiectomy. They are capable of orgasm. They deny any hernias or masses. They are uncircumcised and has not had any genital operations. They have not undergone any type of hair removal on their genitals. They deny any history of DVT/PE. They also deny any history of issues with general anesthesia.  Patient is currently unemployed looking for work. They are currently supporting themselves financially with savings at the moment. Lives with brother. Patient has not disclosed identity or surgical plans with brother. They will need to do this in order to proceed or determine a different support plan. Endorses marijuana use. The patient agreed to avoid all marijuana smoking for 6 weeks before surgery and 6 weeks after surgery and to avoid all THC use for 2 weeks before and after surgery. Denies tobacco use, rare alcohol use, and denies any other recreational drug use. Patient denies any history of psychiatric hospitalization or ER admission.

## 2024-09-26 ENCOUNTER — APPOINTMENT (OUTPATIENT)
Dept: TRANSGENDER CARE | Facility: CLINIC | Age: 24
End: 2024-09-26
Payer: COMMERCIAL

## 2024-09-26 VITALS
WEIGHT: 299 LBS | HEART RATE: 95 BPM | SYSTOLIC BLOOD PRESSURE: 132 MMHG | TEMPERATURE: 98.7 F | OXYGEN SATURATION: 96 % | BODY MASS INDEX: 42.8 KG/M2 | HEIGHT: 70 IN | DIASTOLIC BLOOD PRESSURE: 74 MMHG

## 2024-09-26 DIAGNOSIS — R79.89 OTHER SPECIFIED ABNORMAL FINDINGS OF BLOOD CHEMISTRY: ICD-10-CM

## 2024-09-26 DIAGNOSIS — F64.9 GENDER IDENTITY DISORDER, UNSPECIFIED: ICD-10-CM

## 2024-09-26 DIAGNOSIS — G47.9 SLEEP DISORDER, UNSPECIFIED: ICD-10-CM

## 2024-09-26 DIAGNOSIS — F32.A DEPRESSION, UNSPECIFIED: ICD-10-CM

## 2024-09-26 PROCEDURE — G2211 COMPLEX E/M VISIT ADD ON: CPT | Mod: NC

## 2024-09-26 PROCEDURE — 36415 COLL VENOUS BLD VENIPUNCTURE: CPT

## 2024-09-26 PROCEDURE — 99215 OFFICE O/P EST HI 40 MIN: CPT

## 2024-09-26 NOTE — HISTORY OF PRESENT ILLNESS
[de-identified] : Here for f/u Life has been very hectic  Reports has been inconsistent - stopped PrEP months ago. Taking all pills about every other day.  Has been inconsistent with hormones, last inj this AM, missing about every other week  Reviewed meds Still unemployed, looking for work, not going well  Had vaginoplasty consult but unsure if wants to go through w it as main goal is to be able to have receptive sex and unsure that this would be possible  Feels tired all the time, wakes up tired. Not sure if they snore.   
FAMILY HISTORY:  Family history of hypercholesterolemia    Sibling  Still living? Unknown  Family history of ulcerative colitis, Age at diagnosis: Age Unknown

## 2024-09-26 NOTE — ASSESSMENT
[FreeTextEntry1] : -- did inj today, level unclear will check  -- subclinical hypothyroid? re check today  -- sleep apnea  The patient has a moderate to high pre-test probability of Obstructive Sleep Apnea. This assessment is based on a high STOP-BANG score (at least 5), medical history and clinical examination. As a result, diagnostic polysomnography is recommended to determine the presence and severity of this disorder.  -- depression patient declines referral to higher level of care or additional titration of meds. a lot of it is situational. will consider next time.

## 2024-09-27 ENCOUNTER — TRANSCRIPTION ENCOUNTER (OUTPATIENT)
Age: 24
End: 2024-09-27

## 2024-09-27 LAB
ALBUMIN SERPL ELPH-MCNC: 4.5 G/DL
ALP BLD-CCNC: 83 U/L
ALT SERPL-CCNC: 35 U/L
ANION GAP SERPL CALC-SCNC: 19 MMOL/L
AST SERPL-CCNC: 21 U/L
BILIRUB SERPL-MCNC: 0.2 MG/DL
BUN SERPL-MCNC: 14 MG/DL
CALCIUM SERPL-MCNC: 9.2 MG/DL
CHLORIDE SERPL-SCNC: 104 MMOL/L
CO2 SERPL-SCNC: 19 MMOL/L
CREAT SERPL-MCNC: 0.9 MG/DL
EGFR: 122 ML/MIN/1.73M2
ESTRADIOL SERPL-MCNC: 21 PG/ML
GLUCOSE SERPL-MCNC: 150 MG/DL
POTASSIUM SERPL-SCNC: 3.9 MMOL/L
PROT SERPL-MCNC: 7.2 G/DL
SODIUM SERPL-SCNC: 143 MMOL/L
T4 FREE SERPL-MCNC: 1.1 NG/DL
TESTOST SERPL-MCNC: 39.9 NG/DL
TSH SERPL-ACNC: 2.64 UIU/ML

## 2024-09-30 ENCOUNTER — TRANSCRIPTION ENCOUNTER (OUTPATIENT)
Age: 24
End: 2024-09-30

## 2024-10-18 ENCOUNTER — OUTPATIENT (OUTPATIENT)
Dept: OUTPATIENT SERVICES | Facility: HOSPITAL | Age: 24
LOS: 1 days | End: 2024-10-18
Payer: COMMERCIAL

## 2024-10-18 DIAGNOSIS — G47.33 OBSTRUCTIVE SLEEP APNEA (ADULT) (PEDIATRIC): ICD-10-CM

## 2024-10-18 PROCEDURE — 95810 POLYSOM 6/> YRS 4/> PARAM: CPT

## 2024-10-18 PROCEDURE — 95810 POLYSOM 6/> YRS 4/> PARAM: CPT | Mod: 26

## 2024-12-17 ENCOUNTER — APPOINTMENT (OUTPATIENT)
Dept: TRANSGENDER CARE | Facility: CLINIC | Age: 24
End: 2024-12-17
Payer: COMMERCIAL

## 2024-12-17 VITALS
DIASTOLIC BLOOD PRESSURE: 92 MMHG | HEIGHT: 70 IN | TEMPERATURE: 97.4 F | HEART RATE: 88 BPM | OXYGEN SATURATION: 97 % | WEIGHT: 315 LBS | BODY MASS INDEX: 45.1 KG/M2 | SYSTOLIC BLOOD PRESSURE: 142 MMHG

## 2024-12-17 DIAGNOSIS — F41.1 GENERALIZED ANXIETY DISORDER: ICD-10-CM

## 2024-12-17 DIAGNOSIS — F64.9 GENDER IDENTITY DISORDER, UNSPECIFIED: ICD-10-CM

## 2024-12-17 DIAGNOSIS — F33.2 MAJOR DEPRESSIVE DISORDER, RECURRENT SEVERE W/OUT PSYCHOTIC FEATURES: ICD-10-CM

## 2024-12-17 DIAGNOSIS — F90.9 ATTENTION-DEFICIT HYPERACTIVITY DISORDER, UNSPECIFIED TYPE: ICD-10-CM

## 2024-12-17 PROCEDURE — G2211 COMPLEX E/M VISIT ADD ON: CPT | Mod: NC

## 2024-12-17 PROCEDURE — 99215 OFFICE O/P EST HI 40 MIN: CPT

## 2024-12-17 RX ORDER — LISDEXAMFETAMINE 20 MG/1
20 CAPSULE ORAL DAILY
Qty: 30 | Refills: 0 | Status: ACTIVE | COMMUNITY
Start: 2024-12-17 | End: 1900-01-01

## 2025-01-28 ENCOUNTER — LABORATORY RESULT (OUTPATIENT)
Age: 25
End: 2025-01-28

## 2025-01-28 ENCOUNTER — APPOINTMENT (OUTPATIENT)
Dept: TRANSGENDER CARE | Facility: CLINIC | Age: 25
End: 2025-01-28
Payer: COMMERCIAL

## 2025-01-28 VITALS
DIASTOLIC BLOOD PRESSURE: 80 MMHG | TEMPERATURE: 98.3 F | RESPIRATION RATE: 18 BRPM | SYSTOLIC BLOOD PRESSURE: 130 MMHG | HEART RATE: 88 BPM | OXYGEN SATURATION: 96 %

## 2025-01-28 DIAGNOSIS — F90.9 ATTENTION-DEFICIT HYPERACTIVITY DISORDER, UNSPECIFIED TYPE: ICD-10-CM

## 2025-01-28 DIAGNOSIS — F33.2 MAJOR DEPRESSIVE DISORDER, RECURRENT SEVERE W/OUT PSYCHOTIC FEATURES: ICD-10-CM

## 2025-01-28 DIAGNOSIS — R30.0 DYSURIA: ICD-10-CM

## 2025-01-28 DIAGNOSIS — F64.9 GENDER IDENTITY DISORDER, UNSPECIFIED: ICD-10-CM

## 2025-01-28 PROCEDURE — 99214 OFFICE O/P EST MOD 30 MIN: CPT

## 2025-01-28 PROCEDURE — G2211 COMPLEX E/M VISIT ADD ON: CPT | Mod: NC

## 2025-01-28 PROCEDURE — 36415 COLL VENOUS BLD VENIPUNCTURE: CPT

## 2025-01-29 LAB
APPEARANCE: CLEAR
BILIRUBIN URINE: NEGATIVE
BLOOD URINE: NEGATIVE
COLOR: YELLOW
ESTRADIOL SERPL-MCNC: 112 PG/ML
FSH SERPL-MCNC: 0.4 IU/L
GLUCOSE QUALITATIVE U: NEGATIVE
KETONES URINE: NEGATIVE
LEUKOCYTE ESTERASE URINE: NEGATIVE
NITRITE URINE: NEGATIVE
PH URINE: 6
PROTEIN URINE: ABNORMAL
SPECIFIC GRAVITY URINE: >=1.03
TESTOST SERPL-MCNC: 40 NG/DL
UROBILINOGEN URINE: NORMAL

## 2025-02-10 ENCOUNTER — TRANSCRIPTION ENCOUNTER (OUTPATIENT)
Age: 25
End: 2025-02-10

## 2025-02-19 ENCOUNTER — APPOINTMENT (OUTPATIENT)
Dept: PLASTIC SURGERY | Facility: CLINIC | Age: 25
End: 2025-02-19

## 2025-03-24 ENCOUNTER — TRANSCRIPTION ENCOUNTER (OUTPATIENT)
Age: 25
End: 2025-03-24

## 2025-03-27 ENCOUNTER — TRANSCRIPTION ENCOUNTER (OUTPATIENT)
Age: 25
End: 2025-03-27

## 2025-04-29 ENCOUNTER — APPOINTMENT (OUTPATIENT)
Dept: TRANSGENDER CARE | Facility: CLINIC | Age: 25
End: 2025-04-29

## 2025-08-07 ENCOUNTER — TRANSCRIPTION ENCOUNTER (OUTPATIENT)
Age: 25
End: 2025-08-07

## 2025-08-08 ENCOUNTER — TRANSCRIPTION ENCOUNTER (OUTPATIENT)
Age: 25
End: 2025-08-08

## 2025-08-13 ENCOUNTER — TRANSCRIPTION ENCOUNTER (OUTPATIENT)
Age: 25
End: 2025-08-13

## 2025-08-13 RX ORDER — SYRINGE, DISPOSABLE, 1 ML
1 ML SYRINGE, EMPTY DISPOSABLE MISCELLANEOUS
Qty: 100 | Refills: 3 | Status: ACTIVE | COMMUNITY
Start: 2025-08-07 | End: 1900-01-01

## 2025-08-13 RX ORDER — NEEDLES, DISPOSABLE 25GX5/8"
23G X 1" NEEDLE, DISPOSABLE MISCELLANEOUS
Qty: 1 | Refills: 2 | Status: ACTIVE | COMMUNITY
Start: 2025-08-13 | End: 1900-01-01

## 2025-09-02 ENCOUNTER — APPOINTMENT (OUTPATIENT)
Dept: TRANSGENDER CARE | Facility: CLINIC | Age: 25
End: 2025-09-02
Payer: COMMERCIAL

## 2025-09-02 VITALS
DIASTOLIC BLOOD PRESSURE: 70 MMHG | SYSTOLIC BLOOD PRESSURE: 122 MMHG | HEIGHT: 70 IN | TEMPERATURE: 98.6 F | WEIGHT: 297 LBS | BODY MASS INDEX: 42.52 KG/M2 | OXYGEN SATURATION: 97 % | HEART RATE: 82 BPM

## 2025-09-02 DIAGNOSIS — F33.2 MAJOR DEPRESSIVE DISORDER, RECURRENT SEVERE W/OUT PSYCHOTIC FEATURES: ICD-10-CM

## 2025-09-02 DIAGNOSIS — F64.9 GENDER IDENTITY DISORDER, UNSPECIFIED: ICD-10-CM

## 2025-09-02 PROCEDURE — 36415 COLL VENOUS BLD VENIPUNCTURE: CPT

## 2025-09-02 PROCEDURE — G2211 COMPLEX E/M VISIT ADD ON: CPT | Mod: NC

## 2025-09-02 PROCEDURE — 99214 OFFICE O/P EST MOD 30 MIN: CPT

## 2025-09-03 LAB
ALBUMIN SERPL ELPH-MCNC: 4.7 G/DL
ALP BLD-CCNC: 52 U/L
ALT SERPL-CCNC: 13 U/L
ANION GAP SERPL CALC-SCNC: 14 MMOL/L
AST SERPL-CCNC: 19 U/L
BASOPHILS # BLD AUTO: 0.04 K/UL
BASOPHILS NFR BLD AUTO: 0.4 %
BILIRUB SERPL-MCNC: 0.4 MG/DL
BUN SERPL-MCNC: 15 MG/DL
CALCIUM SERPL-MCNC: 9.7 MG/DL
CHLORIDE SERPL-SCNC: 105 MMOL/L
CO2 SERPL-SCNC: 20 MMOL/L
CREAT SERPL-MCNC: 0.91 MG/DL
EGFRCR SERPLBLD CKD-EPI 2021: 119 ML/MIN/1.73M2
EOSINOPHIL # BLD AUTO: 0.15 K/UL
EOSINOPHIL NFR BLD AUTO: 1.4 %
ESTRADIOL SERPL-MCNC: 328 PG/ML
FSH SERPL-MCNC: <0.3 IU/L
GLUCOSE SERPL-MCNC: 97 MG/DL
HCT VFR BLD CALC: 38.4 %
HGB BLD-MCNC: 13.3 G/DL
IMM GRANULOCYTES NFR BLD AUTO: 0.2 %
LH SERPL-ACNC: 0.6 IU/L
LYMPHOCYTES # BLD AUTO: 2.35 K/UL
LYMPHOCYTES NFR BLD AUTO: 21.6 %
MAN DIFF?: NORMAL
MCHC RBC-ENTMCNC: 28.6 PG
MCHC RBC-ENTMCNC: 34.6 G/DL
MCV RBC AUTO: 82.6 FL
MONOCYTES # BLD AUTO: 0.53 K/UL
MONOCYTES NFR BLD AUTO: 4.9 %
NEUTROPHILS # BLD AUTO: 7.77 K/UL
NEUTROPHILS NFR BLD AUTO: 71.5 %
PLATELET # BLD AUTO: 283 K/UL
POTASSIUM SERPL-SCNC: 4.2 MMOL/L
PROT SERPL-MCNC: 7.3 G/DL
RBC # BLD: 4.65 M/UL
RBC # FLD: 13.2 %
SODIUM SERPL-SCNC: 138 MMOL/L
TESTOST SERPL-MCNC: 23.9 NG/DL
WBC # FLD AUTO: 10.86 K/UL

## 2025-09-15 ENCOUNTER — APPOINTMENT (OUTPATIENT)
Dept: TRANSGENDER CARE | Facility: CLINIC | Age: 25
End: 2025-09-15